# Patient Record
Sex: FEMALE | Race: WHITE | NOT HISPANIC OR LATINO | Employment: FULL TIME | URBAN - NONMETROPOLITAN AREA
[De-identification: names, ages, dates, MRNs, and addresses within clinical notes are randomized per-mention and may not be internally consistent; named-entity substitution may affect disease eponyms.]

---

## 2017-01-15 ENCOUNTER — APPOINTMENT (OUTPATIENT)
Dept: CT IMAGING | Facility: HOSPITAL | Age: 27
End: 2017-01-15

## 2017-01-15 ENCOUNTER — HOSPITAL ENCOUNTER (EMERGENCY)
Facility: HOSPITAL | Age: 27
Discharge: HOME | End: 2017-01-15
Attending: EMERGENCY MEDICINE | Admitting: EMERGENCY MEDICINE

## 2017-01-15 VITALS
HEIGHT: 65 IN | BODY MASS INDEX: 46.65 KG/M2 | OXYGEN SATURATION: 99 % | TEMPERATURE: 98.6 F | WEIGHT: 280 LBS | DIASTOLIC BLOOD PRESSURE: 65 MMHG | SYSTOLIC BLOOD PRESSURE: 127 MMHG | RESPIRATION RATE: 14 BRPM | HEART RATE: 86 BPM

## 2017-01-15 DIAGNOSIS — N20.1 LEFT URETERAL STONE: Primary | ICD-10-CM

## 2017-01-15 LAB
ALBUMIN SERPL-MCNC: 4.4 G/DL (ref 3.5–5)
ALBUMIN/GLOB SERPL: 1.3 G/DL (ref 1.1–2.5)
ALP SERPL-CCNC: 60 U/L (ref 24–120)
ALT SERPL W P-5'-P-CCNC: 116 U/L (ref 0–54)
AMYLASE SERPL-CCNC: 66 U/L (ref 30–110)
ANION GAP SERPL CALCULATED.3IONS-SCNC: 16 MMOL/L (ref 4–13)
AST SERPL-CCNC: 56 U/L (ref 7–45)
B-HCG SERPL-ACNC: 0 MIU/ML (ref 0–5)
BACTERIA UR QL AUTO: ABNORMAL /HPF
BASOPHILS # BLD AUTO: 0.05 10*3/MM3 (ref 0–0.2)
BASOPHILS NFR BLD AUTO: 0.5 % (ref 0–2)
BILIRUB SERPL-MCNC: 0.7 MG/DL (ref 0.1–1)
BILIRUB UR QL STRIP: NEGATIVE
BUN BLD-MCNC: 15 MG/DL (ref 5–21)
BUN/CREAT SERPL: 18.8 (ref 7–25)
CALCIUM SPEC-SCNC: 9.5 MG/DL (ref 8.4–10.4)
CHLORIDE SERPL-SCNC: 105 MMOL/L (ref 98–110)
CLARITY UR: ABNORMAL
CO2 SERPL-SCNC: 20 MMOL/L (ref 24–31)
COLOR UR: ABNORMAL
CREAT BLD-MCNC: 0.8 MG/DL (ref 0.5–1.4)
D-LACTATE SERPL-SCNC: 1.8 MMOL/L (ref 0.5–2)
DEPRECATED RDW RBC AUTO: 39 FL (ref 40–54)
EOSINOPHIL # BLD AUTO: 0.05 10*3/MM3 (ref 0–0.7)
EOSINOPHIL NFR BLD AUTO: 0.5 % (ref 0–4)
ERYTHROCYTE [DISTWIDTH] IN BLOOD BY AUTOMATED COUNT: 13 % (ref 12–15)
GFR SERPL CREATININE-BSD FRML MDRD: 88 ML/MIN/1.73
GLOBULIN UR ELPH-MCNC: 3.5 GM/DL
GLUCOSE BLD-MCNC: 136 MG/DL (ref 70–100)
GLUCOSE UR STRIP-MCNC: NEGATIVE MG/DL
HCT VFR BLD AUTO: 41.6 % (ref 37–47)
HGB BLD-MCNC: 14.6 G/DL (ref 12–16)
HGB UR QL STRIP.AUTO: ABNORMAL
HOLD SPECIMEN: NORMAL
HOLD SPECIMEN: NORMAL
HYALINE CASTS UR QL AUTO: ABNORMAL /LPF
IMM GRANULOCYTES # BLD: 0.02 10*3/MM3 (ref 0–0.03)
IMM GRANULOCYTES NFR BLD: 0.2 % (ref 0–5)
KETONES UR QL STRIP: NEGATIVE
LEUKOCYTE ESTERASE UR QL STRIP.AUTO: ABNORMAL
LIPASE SERPL-CCNC: 129 U/L (ref 23–203)
LYMPHOCYTES # BLD AUTO: 3.55 10*3/MM3 (ref 0.72–4.86)
LYMPHOCYTES NFR BLD AUTO: 36 % (ref 15–45)
MCH RBC QN AUTO: 29.4 PG (ref 28–32)
MCHC RBC AUTO-ENTMCNC: 35.1 G/DL (ref 33–36)
MCV RBC AUTO: 83.7 FL (ref 82–98)
MONOCYTES # BLD AUTO: 1.13 10*3/MM3 (ref 0.19–1.3)
MONOCYTES NFR BLD AUTO: 11.5 % (ref 4–12)
NEUTROPHILS # BLD AUTO: 5.06 10*3/MM3 (ref 1.87–8.4)
NEUTROPHILS NFR BLD AUTO: 51.3 % (ref 39–78)
NITRITE UR QL STRIP: NEGATIVE
PH UR STRIP.AUTO: 5.5 [PH] (ref 5–8)
PLATELET # BLD AUTO: 309 10*3/MM3 (ref 130–400)
PMV BLD AUTO: 11.1 FL (ref 6–12)
POTASSIUM BLD-SCNC: 3.5 MMOL/L (ref 3.5–5.3)
PROT SERPL-MCNC: 7.9 G/DL (ref 6.3–8.7)
PROT UR QL STRIP: ABNORMAL
RBC # BLD AUTO: 4.97 10*6/MM3 (ref 4.2–5.4)
RBC # UR: ABNORMAL /HPF
REF LAB TEST METHOD: ABNORMAL
SODIUM BLD-SCNC: 141 MMOL/L (ref 135–145)
SP GR UR STRIP: >1.03 (ref 1–1.03)
SQUAMOUS #/AREA URNS HPF: ABNORMAL /HPF
UROBILINOGEN UR QL STRIP: ABNORMAL
WBC NRBC COR # BLD: 9.86 10*3/MM3 (ref 4.8–10.8)
WBC UR QL AUTO: ABNORMAL /HPF
WHOLE BLOOD HOLD SPECIMEN: NORMAL
WHOLE BLOOD HOLD SPECIMEN: NORMAL

## 2017-01-15 PROCEDURE — 84703 CHORIONIC GONADOTROPIN ASSAY: CPT

## 2017-01-15 PROCEDURE — 96375 TX/PRO/DX INJ NEW DRUG ADDON: CPT

## 2017-01-15 PROCEDURE — 80053 COMPREHEN METABOLIC PANEL: CPT | Performed by: EMERGENCY MEDICINE

## 2017-01-15 PROCEDURE — 81001 URINALYSIS AUTO W/SCOPE: CPT | Performed by: EMERGENCY MEDICINE

## 2017-01-15 PROCEDURE — 96361 HYDRATE IV INFUSION ADD-ON: CPT

## 2017-01-15 PROCEDURE — 82150 ASSAY OF AMYLASE: CPT | Performed by: EMERGENCY MEDICINE

## 2017-01-15 PROCEDURE — 25010000002 ONDANSETRON PER 1 MG: Performed by: EMERGENCY MEDICINE

## 2017-01-15 PROCEDURE — 74177 CT ABD & PELVIS W/CONTRAST: CPT

## 2017-01-15 PROCEDURE — 0 IOPAMIDOL 61 % SOLUTION: Performed by: EMERGENCY MEDICINE

## 2017-01-15 PROCEDURE — 85025 COMPLETE CBC W/AUTO DIFF WBC: CPT | Performed by: EMERGENCY MEDICINE

## 2017-01-15 PROCEDURE — 83690 ASSAY OF LIPASE: CPT | Performed by: EMERGENCY MEDICINE

## 2017-01-15 PROCEDURE — 99284 EMERGENCY DEPT VISIT MOD MDM: CPT

## 2017-01-15 PROCEDURE — 25010000002 HYDROMORPHONE PER 4 MG: Performed by: EMERGENCY MEDICINE

## 2017-01-15 PROCEDURE — 87086 URINE CULTURE/COLONY COUNT: CPT | Performed by: EMERGENCY MEDICINE

## 2017-01-15 PROCEDURE — 96374 THER/PROPH/DIAG INJ IV PUSH: CPT

## 2017-01-15 PROCEDURE — 83605 ASSAY OF LACTIC ACID: CPT

## 2017-01-15 RX ORDER — CLONIDINE HYDROCHLORIDE 0.1 MG/1
0.1 TABLET ORAL AS NEEDED
COMMUNITY
End: 2020-03-23

## 2017-01-15 RX ORDER — OXYCODONE AND ACETAMINOPHEN 7.5; 325 MG/1; MG/1
1 TABLET ORAL EVERY 4 HOURS PRN
Qty: 20 TABLET | Refills: 0 | Status: SHIPPED | OUTPATIENT
Start: 2017-01-15 | End: 2020-03-23

## 2017-01-15 RX ORDER — INDOMETHACIN 50 MG/1
50 CAPSULE ORAL
Qty: 21 CAPSULE | Refills: 0 | Status: SHIPPED | OUTPATIENT
Start: 2017-01-15 | End: 2020-03-23

## 2017-01-15 RX ORDER — ONDANSETRON 4 MG/1
4 TABLET, ORALLY DISINTEGRATING ORAL EVERY 4 HOURS PRN
Qty: 10 TABLET | Refills: 0 | Status: SHIPPED | OUTPATIENT
Start: 2017-01-15 | End: 2020-03-23

## 2017-01-15 RX ORDER — LISINOPRIL 5 MG/1
5 TABLET ORAL DAILY
COMMUNITY
End: 2020-03-23

## 2017-01-15 RX ORDER — ONDANSETRON 2 MG/ML
4 INJECTION INTRAMUSCULAR; INTRAVENOUS ONCE
Status: COMPLETED | OUTPATIENT
Start: 2017-01-15 | End: 2017-01-15

## 2017-01-15 RX ORDER — SODIUM CHLORIDE 9 MG/ML
125 INJECTION, SOLUTION INTRAVENOUS CONTINUOUS
Status: DISCONTINUED | OUTPATIENT
Start: 2017-01-15 | End: 2017-01-15 | Stop reason: HOSPADM

## 2017-01-15 RX ADMIN — IOPAMIDOL 100 ML: 612 INJECTION, SOLUTION INTRAVENOUS at 06:19

## 2017-01-15 RX ADMIN — ONDANSETRON 4 MG: 2 INJECTION INTRAMUSCULAR; INTRAVENOUS at 04:57

## 2017-01-15 RX ADMIN — SODIUM CHLORIDE 125 ML/HR: 9 INJECTION, SOLUTION INTRAVENOUS at 04:25

## 2017-01-15 RX ADMIN — HYDROMORPHONE HYDROCHLORIDE 1 MG: 1 INJECTION, SOLUTION INTRAMUSCULAR; INTRAVENOUS; SUBCUTANEOUS at 05:17

## 2017-01-15 NOTE — ED PROVIDER NOTES
Subjective  nausea vomiting abdominal pain  History of Present Illness Evin is a 26-year-old white female who presents today with chief complaint of abdominal pain and back pain.  The patient tells me that while she can tolerate the abdominal pain the back pain was so severe that she was unable to move.  She describes pain in the lower back that seems to radiate around into her lower abdominal area.  She does not have any history of kidney stones.  She denies pregnancy at this time she additionally states she has had no frequency urgency or dysuria.  There has been no lightheadedness.  She has never had pain like this before.  She did not take anything at home to help alleviate her discomfort.    Review of Systems   Constitutional: Negative.    HENT: Negative.    Eyes: Negative.    Respiratory: Negative.    Cardiovascular: Negative.    Gastrointestinal: Positive for abdominal pain, nausea and vomiting.   Endocrine: Negative.    Genitourinary: Negative.    Musculoskeletal: Positive for back pain.   Skin: Negative.    Allergic/Immunologic: Negative.    Neurological: Negative.    Hematological: Negative.    Psychiatric/Behavioral: Negative.    All other systems reviewed and are negative.      Past Medical History   Diagnosis Date   • Hypertension        Allergies   Allergen Reactions   • Penicillins        Past Surgical History   Procedure Laterality Date   • Tonsillectomy         History reviewed. No pertinent family history.    Social History     Social History   • Marital status: Single     Spouse name: N/A   • Number of children: N/A   • Years of education: N/A     Social History Main Topics   • Smoking status: Never Smoker   • Smokeless tobacco: None   • Alcohol use No   • Drug use: No   • Sexual activity: Defer     Other Topics Concern   • None     Social History Narrative   • None       Prior to Admission medications    Medication Sig Start Date End Date Taking? Authorizing Provider   CloNIDine (CATAPRES)  0.1 MG tablet Take 0.1 mg by mouth As Needed for high blood pressure.   Yes Historical Provider, MD   estradiol cypionate (DEPO-ESTRADIOL) 5 MG/ML injection Inject  into the shoulder, thigh, or buttocks Every 28 (Twenty-Eight) Days.   Yes Historical Provider, MD   lisinopril (PRINIVIL,ZESTRIL) 5 MG tablet Take 5 mg by mouth Daily.   Yes Historical Provider, MD       Medications   ondansetron (ZOFRAN) injection 4 mg (4 mg Intravenous Given 1/15/17 3543)   HYDROmorphone (DILAUDID) injection 1 mg (1 mg Intravenous Given 1/15/17 8694)   iopamidol (ISOVUE-300) 61 % injection 100 mL (100 mL Intravenous Given 1/15/17 1325)       Objective   Physical Exam   Constitutional: She is oriented to person, place, and time. She appears well-developed and well-nourished.   HENT:   Head: Normocephalic and atraumatic.   Right Ear: External ear normal.   Left Ear: External ear normal.   Nose: Nose normal.   Mouth/Throat: Oropharynx is clear and moist.   Eyes: Conjunctivae and EOM are normal. Pupils are equal, round, and reactive to light. Right eye exhibits no discharge. Left eye exhibits no discharge.   Neck: Normal range of motion. Neck supple. No thyromegaly present.   Cardiovascular: Normal rate, regular rhythm, normal heart sounds and intact distal pulses.  Exam reveals no friction rub.    No murmur heard.  Pulmonary/Chest: Effort normal and breath sounds normal. No respiratory distress.   Abdominal: Soft. Bowel sounds are normal. She exhibits no distension and no mass. There is tenderness. There is no rebound and no guarding. No hernia.   Musculoskeletal: Normal range of motion. She exhibits no edema or deformity.   Neurological: She is alert and oriented to person, place, and time. She has normal reflexes. No cranial nerve deficit.   Skin: Skin is warm and dry. No rash noted.   Psychiatric: Judgment normal.   Nursing note and vitals reviewed.      Procedures         Visit Vitals   • /65 (BP Location: Right arm, Patient  "Position: Lying)   • Pulse 86   • Temp 98.6 °F (37 °C) (Oral)   • Resp 14   • Ht 65\" (165.1 cm)   • Wt 280 lb (127 kg)   • SpO2 99%   • BMI 46.59 kg/m2       Lab Results (last 24 hours)     ** No results found for the last 24 hours. **          CT Abdomen Pelvis With Contrast   Final Result   1. Mild left-sided obstructive uropathy secondary to a distal left   ureteral stone measuring 4 mm in size. This is just above the left UVJ.   A 5 mm nonobstructing calculus is noted involving the lower pole of the   left kidney   2. Diffuse steatosis of the liver.   3. Otherwise unremarkable exam.           This report was finalized on 01/15/2017 08:27 by Dr. Edil Kate MD.              ED Course  ED Course          MDM  Number of Diagnoses or Management Options  Left ureteral stone: new and requires workup     Amount and/or Complexity of Data Reviewed  Clinical lab tests: ordered and reviewed  Tests in the radiology section of CPT®: ordered and reviewed  Discuss the patient with other providers: yes    Risk of Complications, Morbidity, and/or Mortality  Presenting problems: high  Diagnostic procedures: high  Management options: high    Patient Progress  Patient progress: improved      Differential diagnosis included but was not limited to Lower abdominal pain:  Diverticulitis, gastroenteritis, Irritable Bowel Syndrom, Hernia, intestinal obstruction, constipation, urinary calculi and cystitis>>. All labs and imaging results were reviewed by Kimberlyn Bush MD    Final diagnoses:   Left ureteral stone        Kimberlyn Bush MD  01/25/17 0739    "

## 2017-01-17 LAB — BACTERIA SPEC AEROBE CULT: NORMAL

## 2020-03-23 ENCOUNTER — OFFICE VISIT (OUTPATIENT)
Dept: FAMILY MEDICINE CLINIC | Facility: CLINIC | Age: 30
End: 2020-03-23

## 2020-03-23 VITALS
OXYGEN SATURATION: 97 % | HEART RATE: 106 BPM | HEIGHT: 62 IN | BODY MASS INDEX: 53.92 KG/M2 | WEIGHT: 293 LBS | SYSTOLIC BLOOD PRESSURE: 125 MMHG | DIASTOLIC BLOOD PRESSURE: 67 MMHG

## 2020-03-23 DIAGNOSIS — G44.229 CHRONIC TENSION-TYPE HEADACHE, NOT INTRACTABLE: Primary | ICD-10-CM

## 2020-03-23 DIAGNOSIS — F41.9 ANXIETY: ICD-10-CM

## 2020-03-23 DIAGNOSIS — I10 ESSENTIAL HYPERTENSION: ICD-10-CM

## 2020-03-23 DIAGNOSIS — F32.9 REACTIVE DEPRESSION: ICD-10-CM

## 2020-03-23 DIAGNOSIS — B30.9 VIRAL CONJUNCTIVITIS OF RIGHT EYE: ICD-10-CM

## 2020-03-23 PROCEDURE — 99203 OFFICE O/P NEW LOW 30 MIN: CPT | Performed by: FAMILY MEDICINE

## 2020-03-23 RX ORDER — BUTALBITAL, ACETAMINOPHEN AND CAFFEINE 300; 40; 50 MG/1; MG/1; MG/1
1 CAPSULE ORAL EVERY 4 HOURS PRN
Qty: 30 CAPSULE | Refills: 0 | Status: SHIPPED | OUTPATIENT
Start: 2020-03-23 | End: 2020-06-09

## 2020-03-23 RX ORDER — CITALOPRAM 20 MG/1
20 TABLET ORAL DAILY
Qty: 30 TABLET | Refills: 5 | Status: SHIPPED | OUTPATIENT
Start: 2020-03-23 | End: 2021-06-08

## 2020-03-23 NOTE — PROGRESS NOTES
OFFICE VISIT NOTE:    Hui Jimenez is a 30 y.o. female who presents today for Establish Care; Blurred Vision; Headache; and Flushing.     BP reportedly 140/110 this am at home. 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 Not helping the headaches at all. No day off since the end of December. Tried many OTC meds without help. Body is drained, but cannot sleep although tired.   Eyes are hurting and red, and using Visine but not helping either. Light-headed but no syncope. Does have some shakes on occasion. Some upset stomach but no emesis. No auras nor scotomata. Occasional blurry vision.   Also, has had right sided eye drainage and redness for about 2-3 days, without a sensation of foreign body.     Headache    This is a chronic problem. The current episode started more than 1 year ago. The problem occurs daily. The problem has been gradually worsening. The pain is located in the bilateral, frontal and temporal region. The pain does not radiate. The pain quality is similar to prior headaches. The quality of the pain is described as stabbing and shooting. The pain is moderate. Associated symptoms include blurred vision, phonophobia and photophobia. Pertinent negatives include no abdominal pain, fever, scalp tenderness, tinnitus, vomiting or weight loss. The symptoms are aggravated by bright light, activity, emotional stress, work, noise and weather changes. She has tried acetaminophen, NSAIDs and Excedrin for the symptoms. The treatment provided mild relief. Her past medical history is significant for hypertension and migraines in the family.        Past medical/surgical history, Family history, Social history, Allergies and Medications have been reviewed with the patient today and are updated in [facility] EMR. See below.    Past Medical History:   Diagnosis Date   • Hypertension      Past Surgical History:   Procedure Laterality Date   • TONSILLECTOMY    "    Family History   Problem Relation Age of Onset   • Diabetes Mother    • Hypertension Mother      Social History     Tobacco Use   • Smoking status: Never Smoker   • Smokeless tobacco: Never Used   Substance Use Topics   • Alcohol use: No   • Drug use: No       ALLERGIES:  Penicillins    CURRENT MEDS:    Current Outpatient Medications:   •  butalbital-acetaminophen-caffeine (ORBIVAN) -40 MG capsule capsule, Take 1 capsule by mouth Every 4 (Four) Hours As Needed (headache)., Disp: 30 capsule, Rfl: 0  •  citalopram (CeleXA) 20 MG tablet, Take 1 tablet by mouth Daily., Disp: 30 tablet, Rfl: 5    REVIEW OF SYSTEMS:  Review of Systems   Constitutional: Negative for activity change, fatigue, fever, unexpected weight gain and unexpected weight loss.   HENT: Negative for tinnitus.    Eyes: Positive for blurred vision and photophobia.   Respiratory: Negative for shortness of breath.    Cardiovascular: Negative for chest pain.   Gastrointestinal: Negative for abdominal pain and vomiting.   Genitourinary: Negative for difficulty urinating.   Skin: Negative for rash.   Neurological: Negative for syncope and headache.       PHYSICAL EXAMINATION:  Vital Signs:  /67 (BP Location: Left arm, Patient Position: Sitting, Cuff Size: Adult)   Pulse 106   Ht 157.5 cm (62\")   Wt 134 kg (296 lb)   SpO2 97%   BMI 54.14 kg/m²   Vitals:    03/23/20 1615   Patient Position: Sitting       Physical Exam   Constitutional: She is oriented to person, place, and time. She appears well-developed and well-nourished. No distress.   HENT:   Head: Normocephalic and atraumatic.   Mouth/Throat: Oropharynx is clear and moist.   Neck: Normal range of motion. Neck supple. No JVD present.   Cardiovascular: Normal rate, regular rhythm, normal heart sounds and intact distal pulses.   Pulmonary/Chest: Effort normal and breath sounds normal. No respiratory distress.   Musculoskeletal: Normal range of motion. She exhibits no edema. "   Neurological: She is alert and oriented to person, place, and time. No cranial nerve deficit or sensory deficit. She exhibits normal muscle tone. Coordination normal.   Skin: Skin is warm and dry. Capillary refill takes less than 2 seconds. No rash noted.   Psychiatric: She has a normal mood and affect. Her behavior is normal.   Nursing note and vitals reviewed.      Procedures    ASSESSMENT/ PLAN:  Problem List Items Addressed This Visit        Cardiovascular and Mediastinum    Essential hypertension       Nervous and Auditory    Chronic tension-type headache, not intractable - Primary    Relevant Medications    butalbital-acetaminophen-caffeine (ORBIVAN) -40 MG capsule capsule    citalopram (CeleXA) 20 MG tablet       Other    Reactive depression    Relevant Medications    citalopram (CeleXA) 20 MG tablet    Anxiety    Relevant Medications    citalopram (CeleXA) 20 MG tablet      Other Visit Diagnoses     Viral conjunctivitis of right eye                Specific Patient Instructions:  MEDICATION Instructions: Encouraged patient to continue routine medicines as prescribed and maintain compliance. Patient instructed to report any adverse side effects or reactions to medicines promptly to the office. Patient instructed to make us aware of any OTC or herbal meds or supplement use.    DIET Recommendations: Patient instructed and provided information on the following nutrition and diet recommendations: Calorie restriction for weight reduction and maintenance. Necessity for adequate daily intake of fluids/water. Also, diet information provided in AVS for specifics.    EXERCISE Instructions: Discussed with patient the need for routine aerobic activity for cardiovascular fitness, 3 times a week for about 30 minutes. Daily exercise for increased fitness and weight reduction goals.    SMOKING Recommendations: Counseled patient and encouraged them on smoking and tobacco cessation if applicable. Discussed the benefits  to all body systems with smoking/tobacco cessation, including decreased cardiac/lung/stroke/cancer risk. Encouraged no vaping use.    HEALTH MAINTENANCE:  Counseling provided to patient/family about routine health maintenance and ANNUAL physicals/labs. Counseling on recommended Vaccinations appropriate for age needed.        Patient's Body mass index is 54.14 kg/m². BMI is above normal parameters. Recommendations include: exercise counseling and nutrition counseling.      Medications or Orders placed this visit:  No orders of the defined types were placed in this encounter.      Medications DISCONTINUED this visit:  Medications Discontinued During This Encounter   Medication Reason   • CloNIDine (CATAPRES) 0.1 MG tablet Historical Med - Therapy completed   • estradiol cypionate (DEPO-ESTRADIOL) 5 MG/ML injection Historical Med - Therapy completed   • indomethacin (INDOCIN) 50 MG capsule Historical Med - Therapy completed   • lisinopril (PRINIVIL,ZESTRIL) 5 MG tablet Historical Med - Therapy completed   • ondansetron ODT (ZOFRAN-ODT) 4 MG disintegrating tablet Historical Med - Therapy completed   • oxyCODONE-acetaminophen (PERCOCET) 7.5-325 MG per tablet Historical Med - Therapy completed       FOLLOW-UP:  Return in about 3 weeks (around 4/13/2020) for Recheck.    I discussed the patients findings and my recommendations with patient.  An After Visit Summary (AVS) was printed and given to the patient at discharge.      Gregg Vincent MD, FAAFP  3/23/2020

## 2020-03-23 NOTE — PATIENT INSTRUCTIONS
Suspect Essential HTN.Good BP control is encouraged with Goal BP based on JNC 8 guidelines 2014 <140/90 for patients with known cardiac disease and diabetes. (MINH. 2014:322 (5):507-520. doi:10.1001/minh.2013.03417): general population <60 yr old goal BP <140/90 and for those >60 <150/90.  For patients of all ages with Diabetes, CKD, Known CAD <140/90. Recommended to the patient to obtain electronic home BP machine with upper arm blood pressure cuff and to check regularly as instructed.  Keep BP log and bring to subsequent visits. Stable, at goal.  a. LABS: routine for hypertension recommended and ordered if necessary.  b. Recommend if you do not have a home BP machine to obtain an electronic machine with arm blood pressure cuff.      c. Monitor BP over the next week and keep log to bring back to office. Discussed medication therapy however pt wants to try to control with diet exercise. .  Your provider  has recommended self-monitoring of your blood pressure.  If you do not have a blood pressure cuff you may purchase one from the local pharmacy.  You may ask the pharmacist which brand and model they recommend.  Obtain your blood pressure measurement at least 2x per week.  You should also check your blood pressure if you experience any symptoms of blurred visit, dizziness or headache.  Please record all blood pressure measurements and bring them to next office visit.  If you have any questions about the accuracy of your blood pressure machine please bring it in to the office and our staff will be happy to check accuracy.   d. Encouraged to eat a low sodium heart healthy diet  e. Offered handout on HTN educational topics.  These were provided if patient requested these today.  f. MEDS: as listed in today's visit.  g. Risks/benefits of current and new medications discussed with the patient and or family today.  The patient/family are aware and accept that if there any side effects they should call or return to clinic  as soon as possible.  Appropriate F/U discussed for topics addressed today. All questions were answered to the  satisfactory state of patient/family.  Should symptoms fail to improve or worsen they agree to call or return to clinic or to go to the ER. Education handouts were offered on any new Rx if requested.  Discussed the importance of following up with any needed screening tests/labs/specialist appointments and any requested follow-up recommended by me today.  Importance of maintaining follow-up discussed and patient accepts that missed appointments can delay diagnosis and potentially lead to worsening of conditions.      Tension Headache, Adult  A tension headache is a feeling of pain, pressure, or aching in the head that is often felt over the front and sides of the head. The pain can be dull, or it can feel tight (constricting). There are two types of tension headache:  · Episodic tension headache. This is when the headaches happen fewer than 15 days a month.  · Chronic tension headache. This is when the headaches happen more than 15 days a month during a 3-month period.  A tension headache can last from 30 minutes to several days. It is the most common kind of headache. Tension headaches are not normally associated with nausea or vomiting, and they do not get worse with physical activity.  What are the causes?  The exact cause of this condition is not known. Tension headaches are often triggered by stress, anxiety, or depression. Other triggers include:  · Alcohol.  · Too much caffeine or caffeine withdrawal.  · Respiratory infections, such as colds, flu, or sinus infections.  · Dental problems or teeth clenching.  · Tiredness (fatigue).  · Holding your head and neck in the same position for a long period of time, such as while using a computer.  · Smoking.  · Arthritis of the neck.  What are the signs or symptoms?  Symptoms of this condition include:  · A feeling of pressure or tightness around the  head.  · Dull, aching head pain.  · Pain over the front and sides of the head.  · Tenderness in the muscles of the head, neck, and shoulders.  How is this diagnosed?  This condition may be diagnosed based on your symptoms, your medical history, and a physical exam. If your symptoms are severe or unusual, you may have imaging tests, such as a CT scan or an MRI of your head. Your vision may also be checked.  How is this treated?  This condition may be treated with lifestyle changes and with medicines that help relieve symptoms.  Follow these instructions at home:  Managing pain  · Take over-the-counter and prescription medicines only as told by your health care provider.  · When you have a headache, lie down in a dark, quiet room.  · If directed, apply ice to the head and neck:  ? Put ice in a plastic bag.  ? Place a towel between your skin and the bag.  ? Leave the ice on for 20 minutes, 2-3 times a day.  · If directed, apply heat to the back of your neck as often as told by your health care provider. Use the heat source that your health care provider recommends, such as a moist heat pack or a heating pad.  ? Place a towel between your skin and the heat source.  ? Leave the heat on for 20-30 minutes.  ? Remove the heat if your skin turns bright red. This is especially important if you are unable to feel pain, heat, or cold. You may have a greater risk of getting burned.  Eating and drinking  · Eat meals on a regular schedule.  · Limit alcohol intake to no more than 1 drink a day for nonpregnant women and 2 drinks a day for men. One drink equals 12 oz of beer, 5 oz of wine, or 1½ oz of hard liquor.  · Drink enough fluid to keep your urine pale yellow.  · Decrease your caffeine intake, or stop using caffeine.  Lifestyle  · Get 7-9 hours of sleep each night, or get the amount of sleep recommended by your health care provider.  · At bedtime, remove all electronic devices from your room. Electronic devices include  computers, phones, and tablets.  · Find ways to manage your stress. Some things that can help relieve stress include:  ? Exercise.  ? Deep breathing exercises.  ? Yoga.  ? Listening to music.  ? Positive mental imagery.  · Try to sit up straight and avoid tensing your muscles.  · Do not use any products that contain nicotine or tobacco, such as cigarettes and e-cigarettes. If you need help quitting, ask your health care provider.  General instructions    · Keep all follow-up visits as told by your health care provider. This is important.  · Avoid any headache triggers. Keep a headache journal to help find out what may trigger your headaches. For example, write down:  ? What you eat and drink.  ? How much sleep you get.  ? Any change to your diet or medicines.  Contact a health care provider if:  · Your headache does not get better.  · Your headache comes back.  · You are sensitive to sounds, light, or smells because of a headache.  · You have nausea or you vomit.  · Your stomach hurts.  Get help right away if:  · You suddenly develop a very severe headache along with any of the following:  ? A stiff neck.  ? Nausea and vomiting.  ? Confusion.  ? Weakness.  ? Double vision or loss of vision.  ? Shortness of breath.  ? Rash.  ? Unusual sleepiness.  ? Fever.  ? Trouble speaking.  ? Pain in your eyes or ears.  ? Trouble walking or balancing.  ? Feeling faint or passing out.  Summary  · A tension headache is a feeling of pain, pressure, or aching in the head that is often felt over the front and sides of the head.  · A tension headache can last from 30 minutes to several days. It is the most common kind of headache.  · This condition may be diagnosed based on your symptoms, your medical history, and a physical exam.  · This condition may be treated with lifestyle changes and with medicines that help relieve symptoms.  This information is not intended to replace advice given to you by your health care provider. Make sure  you discuss any questions you have with your health care provider.  Document Released: 12/18/2006 Document Revised: 03/30/2018 Document Reviewed: 03/30/2018  Elsevier Interactive Patient Education © 2020 [employer]

## 2020-03-24 ENCOUNTER — TELEPHONE (OUTPATIENT)
Dept: FAMILY MEDICINE CLINIC | Facility: CLINIC | Age: 30
End: 2020-03-24

## 2020-03-24 NOTE — TELEPHONE ENCOUNTER
Apparently she got the letter however did not take it to employer.  I printed a copy of the letter and faxed it to number patient provided.

## 2020-03-24 NOTE — TELEPHONE ENCOUNTER
Patient called and stated that she was seen in the office yesterday and that she was placed off of work for a while. She says that her employer is requesting a note from Dr. Vincent be faxed to them directly at [phone].    She can be contacted at [phone] to clarify and confirm any info.

## 2020-04-09 ENCOUNTER — TELEPHONE (OUTPATIENT)
Dept: FAMILY MEDICINE CLINIC | Facility: CLINIC | Age: 30
End: 2020-04-09

## 2020-04-09 NOTE — TELEPHONE ENCOUNTER
Spoke with Pt to reschedule appt for 4/13/20 with Dr. Vincent. Pt stated she does not need appt, only needs paperwork filled out by Dr. Vincent stating she can return to work. Per Pt she will bring this by the office for completed. She asks that this be completed by 4/16/20.

## 2020-05-12 ENCOUNTER — OFFICE VISIT (OUTPATIENT)
Dept: FAMILY MEDICINE CLINIC | Facility: CLINIC | Age: 30
End: 2020-05-12

## 2020-05-12 VITALS
WEIGHT: 293 LBS | HEART RATE: 102 BPM | DIASTOLIC BLOOD PRESSURE: 83 MMHG | HEIGHT: 62 IN | BODY MASS INDEX: 53.92 KG/M2 | OXYGEN SATURATION: 96 % | SYSTOLIC BLOOD PRESSURE: 122 MMHG | TEMPERATURE: 97.8 F

## 2020-05-12 DIAGNOSIS — R53.83 FATIGUE, UNSPECIFIED TYPE: ICD-10-CM

## 2020-05-12 DIAGNOSIS — R63.5 WEIGHT GAIN, ABNORMAL: ICD-10-CM

## 2020-05-12 DIAGNOSIS — E66.01 MORBID OBESITY WITH BMI OF 50.0-59.9, ADULT (HCC): Primary | ICD-10-CM

## 2020-05-12 PROCEDURE — 99214 OFFICE O/P EST MOD 30 MIN: CPT | Performed by: NURSE PRACTITIONER

## 2020-05-12 NOTE — PROGRESS NOTES
Chief Complaint   Patient presents with   • Consult     Discuss weigt loss surgery        Subjective   Hui Jimenez is a 30 y.o.  female who presents today for referral request.    HPI:  OBESITY: Patient has been obese her entire life.  She is very active running 2 miles daily and working out at the gym daily as well.  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  She has tried Weight Watchers for 10 months and lost 30 lbs.  She has not been below 260.  She has eliminated concentrated carbs from her diet.  She does not drink soda or sugary drinks.    Hui has spoken to Dr. Obrien in [place] (Bariatric Surgeon).  He recommended starting here with PCP and will gladly accept her if our endeavors fail.  She is interested in the aid of medication to assist with her weight loss.    Hui Jimenez  has a past medical history of Hypertension and Obesity.    Allergies   Allergen Reactions   • Penicillins        Current Outpatient Medications:   •  butalbital-acetaminophen-caffeine (ORBIVAN) -40 MG capsule capsule, Take 1 capsule by mouth Every 4 (Four) Hours As Needed (headache)., Disp: 30 capsule, Rfl: 0  •  citalopram (CeleXA) 20 MG tablet, Take 1 tablet by mouth Daily., Disp: 30 tablet, Rfl: 5  Past Medical History:   Diagnosis Date   • Hypertension    • Obesity      Past Surgical History:   Procedure Laterality Date   • TONSILLECTOMY       Social History     Socioeconomic History   • Marital status: Single     Spouse name: Not on file   • Number of children: Not on file   • Years of education: Not on file   • Highest education level: Not on file   Tobacco Use   • Smoking status: Never Smoker   • Smokeless tobacco: Never Used   Substance and Sexual Activity   • Alcohol use: No   • Drug use: No   • Sexual activity: Not Currently     Partners: Male     Family History   Problem Relation Age of Onset   • Diabetes Mother    • Hypertension Mother   "      Family history, surgical history, past medical history, Allergies and med's reviewed with patient today and updated in [facility] EMR.     ROS:  Review of Systems   Constitutional: Negative.  Negative for fatigue, fever and unexpected weight change.   HENT: Negative.  Negative for facial swelling, sore throat and trouble swallowing.    Eyes: Negative.  Negative for photophobia, discharge and visual disturbance.   Respiratory: Negative.  Negative for cough, chest tightness and shortness of breath.    Cardiovascular: Negative.  Negative for chest pain and palpitations.   Gastrointestinal: Negative.  Negative for abdominal pain, diarrhea, nausea and vomiting.   Endocrine: Negative.  Negative for polydipsia, polyphagia and polyuria.   Genitourinary: Negative.  Negative for dysuria, flank pain and frequency.   Musculoskeletal: Negative.  Negative for back pain, gait problem and neck pain.   Skin: Negative.  Negative for rash.   Allergic/Immunologic: Negative.    Neurological: Negative.  Negative for dizziness, light-headedness and headaches.   Hematological: Negative.    Psychiatric/Behavioral: Negative.  Negative for self-injury and suicidal ideas.       OBJECTIVE:  Vitals:    05/12/20 0735   BP: 122/83   Pulse: 102   Temp: 97.8 °F (36.6 °C)   TempSrc: Temporal   SpO2: 96%   Weight: 133 kg (293 lb 9.6 oz)   Height: 157.5 cm (62.01\")     Physical Exam   Constitutional: She is oriented to person, place, and time. She appears well-developed and well-nourished. No distress.   HENT:   Head: Normocephalic and atraumatic.   Eyes: Pupils are equal, round, and reactive to light. Conjunctivae and EOM are normal.   Neck: Normal range of motion. Neck supple.   Cardiovascular: Normal rate, regular rhythm, normal heart sounds and intact distal pulses.   No murmur heard.  Pulmonary/Chest: Effort normal and breath sounds normal. No respiratory distress.   Abdominal: Soft. Bowel sounds are normal. She exhibits no distension. There is no " tenderness.   Musculoskeletal: Normal range of motion. She exhibits no edema.   Neurological: She is alert and oriented to person, place, and time.   Skin: Skin is warm and dry. Capillary refill takes less than 2 seconds. She is not diaphoretic. No erythema.   Psychiatric: She has a normal mood and affect. Her behavior is normal. Judgment and thought content normal.   Nursing note and vitals reviewed.      ASSESSMENT/ PLAN:    Hui was seen today for consult.    Diagnoses and all orders for this visit:    Morbid obesity with BMI of 50.0-59.9, adult (CMS/[facility])  -     Comprehensive metabolic panel  -     Hemoglobin A1c  -     Lipid Panel With LDL/HDL Ratio  -     T4  -     TSH  -     CBC w AUTO Differential    Fatigue, unspecified type  -     Comprehensive metabolic panel  -     CBC w AUTO Differential    Weight gain, abnormal  -     Hemoglobin A1c  -     T4  -     TSH    Once lab results are reviewed:  If normal we will proceed with one month of Phentermine and then her annual physical.  Our plan is then to switch to Saxenda.  Patient is agreeable.    Orders Placed Today:     No orders of the defined types were placed in this encounter.       Management Plan:     An After Visit Summary was printed and given to the patient at discharge.    Follow-up: Return in about 4 weeks (around 6/9/2020) for Annual physical with labs today.    Ayah Prasad, APRN 5/12/2020 07:58  This note was electronically signed.

## 2020-05-13 LAB
ALBUMIN SERPL-MCNC: 3.9 G/DL (ref 3.9–5)
ALBUMIN/GLOB SERPL: 1.5 {RATIO} (ref 1.2–2.2)
ALP SERPL-CCNC: 83 IU/L (ref 39–117)
ALT SERPL-CCNC: 36 IU/L (ref 0–32)
AST SERPL-CCNC: 27 IU/L (ref 0–40)
BASOPHILS # BLD AUTO: 0.1 X10E3/UL (ref 0–0.2)
BASOPHILS NFR BLD AUTO: 1 %
BILIRUB SERPL-MCNC: 0.3 MG/DL (ref 0–1.2)
BUN SERPL-MCNC: 16 MG/DL (ref 6–20)
BUN/CREAT SERPL: 20 (ref 9–23)
CALCIUM SERPL-MCNC: 9.5 MG/DL (ref 8.7–10.2)
CHLORIDE SERPL-SCNC: 104 MMOL/L (ref 96–106)
CHOLEST SERPL-MCNC: 182 MG/DL (ref 100–199)
CO2 SERPL-SCNC: 24 MMOL/L (ref 20–29)
CREAT SERPL-MCNC: 0.79 MG/DL (ref 0.57–1)
EOSINOPHIL # BLD AUTO: 0.1 X10E3/UL (ref 0–0.4)
EOSINOPHIL NFR BLD AUTO: 1 %
ERYTHROCYTE [DISTWIDTH] IN BLOOD BY AUTOMATED COUNT: 13.1 % (ref 11.7–15.4)
GLOBULIN SER CALC-MCNC: 2.6 G/DL (ref 1.5–4.5)
GLUCOSE SERPL-MCNC: 88 MG/DL (ref 65–99)
HBA1C MFR BLD: 5.2 % (ref 4.8–5.6)
HCT VFR BLD AUTO: 41.9 % (ref 34–46.6)
HDLC SERPL-MCNC: 56 MG/DL
HGB BLD-MCNC: 13.5 G/DL (ref 11.1–15.9)
IMM GRANULOCYTES # BLD AUTO: 0 X10E3/UL (ref 0–0.1)
IMM GRANULOCYTES NFR BLD AUTO: 0 %
LDLC SERPL CALC-MCNC: 113 MG/DL (ref 0–99)
LDLC/HDLC SERPL: 2 RATIO (ref 0–3.2)
LYMPHOCYTES # BLD AUTO: 2.1 X10E3/UL (ref 0.7–3.1)
LYMPHOCYTES NFR BLD AUTO: 23 %
MCH RBC QN AUTO: 28.3 PG (ref 26.6–33)
MCHC RBC AUTO-ENTMCNC: 32.2 G/DL (ref 31.5–35.7)
MCV RBC AUTO: 88 FL (ref 79–97)
MONOCYTES # BLD AUTO: 1.2 X10E3/UL (ref 0.1–0.9)
MONOCYTES NFR BLD AUTO: 13 %
NEUTROPHILS # BLD AUTO: 5.8 X10E3/UL (ref 1.4–7)
NEUTROPHILS NFR BLD AUTO: 62 %
PLATELET # BLD AUTO: 356 X10E3/UL (ref 150–450)
POTASSIUM SERPL-SCNC: 4.4 MMOL/L (ref 3.5–5.2)
PROT SERPL-MCNC: 6.5 G/DL (ref 6–8.5)
RBC # BLD AUTO: 4.77 X10E6/UL (ref 3.77–5.28)
SODIUM SERPL-SCNC: 143 MMOL/L (ref 134–144)
T4 SERPL-MCNC: 9.6 UG/DL (ref 4.5–12)
TRIGL SERPL-MCNC: 63 MG/DL (ref 0–149)
TSH SERPL DL<=0.005 MIU/L-ACNC: 1.82 UIU/ML (ref 0.45–4.5)
VLDLC SERPL CALC-MCNC: 13 MG/DL (ref 5–40)
WBC # BLD AUTO: 9.3 X10E3/UL (ref 3.4–10.8)

## 2020-05-14 DIAGNOSIS — E66.01 MORBID OBESITY WITH BMI OF 50.0-59.9, ADULT (HCC): Primary | ICD-10-CM

## 2020-05-14 RX ORDER — PHENTERMINE HYDROCHLORIDE 37.5 MG/1
37.5 TABLET ORAL
Qty: 30 TABLET | Refills: 0 | Status: SHIPPED | OUTPATIENT
Start: 2020-05-14 | End: 2020-09-17

## 2020-06-09 ENCOUNTER — OFFICE VISIT (OUTPATIENT)
Dept: FAMILY MEDICINE CLINIC | Facility: CLINIC | Age: 30
End: 2020-06-09

## 2020-06-09 VITALS
WEIGHT: 281.6 LBS | DIASTOLIC BLOOD PRESSURE: 84 MMHG | HEART RATE: 93 BPM | OXYGEN SATURATION: 96 % | TEMPERATURE: 97.7 F | HEIGHT: 62 IN | BODY MASS INDEX: 51.82 KG/M2 | SYSTOLIC BLOOD PRESSURE: 117 MMHG

## 2020-06-09 DIAGNOSIS — E66.01 MORBID OBESITY WITH BMI OF 50.0-59.9, ADULT (HCC): ICD-10-CM

## 2020-06-09 DIAGNOSIS — Z00.00 WELLNESS EXAMINATION: Primary | ICD-10-CM

## 2020-06-09 DIAGNOSIS — Z01.419 ENCOUNTER FOR WELL WOMAN EXAM WITH ROUTINE GYNECOLOGICAL EXAM: ICD-10-CM

## 2020-06-09 PROCEDURE — 99395 PREV VISIT EST AGE 18-39: CPT | Performed by: NURSE PRACTITIONER

## 2020-06-09 RX ORDER — LISINOPRIL 10 MG/1
10 TABLET ORAL DAILY
COMMUNITY
End: 2022-10-13

## 2020-06-09 NOTE — PROGRESS NOTES
Chief Complaint   Patient presents with   • Annual Exam     Pap?        Subjective   Hui Jimenez is a 30 y.o.  female who presents today for annual wellness.    PATIENT CONCERNS:  MORBID OBESITY:  Last appointment we discussed her endeavors on her own to lose weight.  At that time, a one month script of Phentermine was given.  She presents today to discuss possible continuation of phentermine with periods of abstinence or progression to Saxenda, which was discussed at the last encounter.  She has tried multiple diets including weight watchers, esther antolin, Tanmay, Hernandez',  phentermine as well as exercise and calorie-reduced diet.    Hui Jimenez  has a past medical history of Hypertension and Obesity.    Allergies   Allergen Reactions   • Penicillins Other (See Comments)     Patient states childhood allergy       Current Outpatient Medications:   •  citalopram (CeleXA) 20 MG tablet, Take 1 tablet by mouth Daily., Disp: 30 tablet, Rfl: 5  •  lisinopril (PRINIVIL,ZESTRIL) 10 MG tablet, Take 10 mg by mouth Daily., Disp: , Rfl:   •  phentermine (Adipex-P) 37.5 MG tablet, Take 1 tablet by mouth Every Morning Before Breakfast., Disp: 30 tablet, Rfl: 0  •  Liraglutide -Weight Management (Saxenda) 18 MG/3ML solution pen-injector, Inject 0.6 mg SQ daily x 1 week; then 1.2 mg SQ daily x 1 week then 1.8 mg SQ daily thereafter., Disp: 2 pen, Rfl: 5  Past Medical History:   Diagnosis Date   • Hypertension    • Obesity      Past Surgical History:   Procedure Laterality Date   • TONSILLECTOMY       Social History     Socioeconomic History   • Marital status: Single     Spouse name: Not on file   • Number of children: Not on file   • Years of education: Not on file   • Highest education level: Not on file   Tobacco Use   • Smoking status: Never Smoker   • Smokeless tobacco: Never Used   Substance and Sexual Activity   • Alcohol use: No   • Drug use: No   • Sexual activity: Not Currently     Partners: Male      "Birth control/protection: Injection     Family History   Problem Relation Age of Onset   • Diabetes Mother    • Hypertension Mother        Family history, surgical history, past medical history, Allergies and med's reviewed with patient today and updated in [facility] EMR.     ROS:  Review of Systems   Constitutional: Negative.  Negative for fatigue, fever and unexpected weight change.   HENT: Negative.  Negative for facial swelling, sore throat and trouble swallowing.    Eyes: Negative.  Negative for photophobia, discharge and visual disturbance.   Respiratory: Negative.  Negative for cough, chest tightness and shortness of breath.    Cardiovascular: Negative.  Negative for chest pain and palpitations.   Gastrointestinal: Negative.  Negative for abdominal pain, diarrhea, nausea and vomiting.   Endocrine: Negative.  Negative for polydipsia, polyphagia and polyuria.   Genitourinary: Negative.  Negative for dysuria, flank pain and frequency.   Musculoskeletal: Negative.  Negative for back pain, gait problem and neck pain.   Skin: Negative.  Negative for rash.   Allergic/Immunologic: Negative.    Neurological: Negative.  Negative for dizziness, light-headedness and headaches.   Hematological: Negative.    Psychiatric/Behavioral: Negative.  Negative for self-injury and suicidal ideas.       OBJECTIVE:  Vitals:    06/09/20 0740   BP: 117/84   BP Location: Left arm   Patient Position: Sitting   Cuff Size: Large Adult   Pulse: 93   Temp: 97.7 °F (36.5 °C)   SpO2: 96%   Weight: 128 kg (281 lb 9.6 oz)   Height: 157.5 cm (62.01\")     Physical Exam   Constitutional: She is oriented to person, place, and time. She appears well-developed and well-nourished. No distress.   HENT:   Head: Normocephalic and atraumatic.   Right Ear: External ear normal.   Left Ear: External ear normal.   Nose: Nose normal.   Mouth/Throat: Oropharynx is clear and moist.   Eyes: Pupils are equal, round, and reactive to light. Conjunctivae and EOM are normal. "   Neck: Normal range of motion. Neck supple.   Cardiovascular: Normal rate, regular rhythm, normal heart sounds and intact distal pulses.   No murmur heard.  Pulmonary/Chest: Effort normal and breath sounds normal. No respiratory distress. Right breast exhibits no inverted nipple, no mass, no nipple discharge, no skin change and no tenderness. Left breast exhibits no inverted nipple, no mass, no nipple discharge, no skin change and no tenderness.   Abdominal: Soft. Bowel sounds are normal. She exhibits no distension. There is no tenderness.   Genitourinary: Vagina normal and uterus normal. No vaginal discharge found.   Genitourinary Comments: Pap obtained.   Musculoskeletal: Normal range of motion. She exhibits no edema.   Neurological: She is alert and oriented to person, place, and time.   Skin: Skin is warm and dry. Capillary refill takes less than 2 seconds. She is not diaphoretic. No erythema.   Psychiatric: She has a normal mood and affect. Her behavior is normal. Judgment and thought content normal.   Nursing note and vitals reviewed.      ASSESSMENT/ PLAN:    uHi was seen today for annual exam.    Diagnoses and all orders for this visit:    Wellness examination    Encounter for well woman exam with routine gynecological exam  -     Pap IG, Rfx HPV All Pth    Morbid obesity with BMI of 50.0-59.9, adult (CMS/[facility])  -     Liraglutide -Weight Management (Saxenda) 18 MG/3ML solution pen-injector; Inject 0.6 mg SQ daily x 1 week; then 1.2 mg SQ daily x 1 week then 1.8 mg SQ daily thereafter.    Patient counseled to continue her exercise program and calorie reduced diet.  Patient counseled to continue safe distancing and use of mask when in public and in crowded areas.  Patient counseled to contact provider with new medication concerns, once started.    Orders Placed Today:     New Medications Ordered This Visit   Medications   • Liraglutide -Weight Management (Saxenda) 18 MG/3ML solution pen-injector     Sig:  Inject 0.6 mg SQ daily x 1 week; then 1.2 mg SQ daily x 1 week then 1.8 mg SQ daily thereafter.     Dispense:  2 pen     Refill:  5        Management Plan:     An After Visit Summary was printed and given to the patient at discharge.    Follow-up: Return in about 3 months (around 9/9/2020) for Next scheduled follow up.    Ayah Prasad, CHERYL 6/9/2020 08:12  This note was electronically signed.

## 2020-06-10 LAB
CONV .: NORMAL
CYTOLOGIST CVX/VAG CYTO: NORMAL
CYTOLOGY CVX/VAG DOC CYTO: NORMAL
CYTOLOGY CVX/VAG DOC THIN PREP: NORMAL
DX ICD CODE: NORMAL
HIV 1 & 2 AB SER-IMP: NORMAL
OTHER STN SPEC: NORMAL
STAT OF ADQ CVX/VAG CYTO-IMP: NORMAL

## 2020-06-11 ENCOUNTER — TELEPHONE (OUTPATIENT)
Dept: FAMILY MEDICINE CLINIC | Facility: CLINIC | Age: 30
End: 2020-06-11

## 2020-06-11 NOTE — TELEPHONE ENCOUNTER
Ok.  Yes, the corrected weight is noted.  Will send to [facility] and we will attempt PA tomorrow.

## 2020-06-11 NOTE — TELEPHONE ENCOUNTER
Called and left patient a message on  advising continuing with Phentermine is fine and to remember this medication is 30 days on and 30 off .

## 2020-06-11 NOTE — TELEPHONE ENCOUNTER
Patient called and stated that whoever interpreted her vm was incorrect and she called advising that she wanted Mrs. Poon to know that she lost 12 pounds and not 4 pounds and the note stated and that she does want to try and get the Saxenda approved.  Please advise?

## 2020-06-11 NOTE — TELEPHONE ENCOUNTER
Patient called and stated she has lost more then 4 lbs on phentermine (per discussion with Ayah at last OV). She believes the pill is working good for her and would like to continue instead of switching to Saxenda? Please advise

## 2020-06-12 NOTE — TELEPHONE ENCOUNTER
PA for Saxenda has been completed and approved through patient's insurance.     Called and left Vm on patient's phone advising her of this.

## 2020-07-30 ENCOUNTER — TELEPHONE (OUTPATIENT)
Dept: FAMILY MEDICINE CLINIC | Facility: CLINIC | Age: 30
End: 2020-07-30

## 2020-09-17 ENCOUNTER — OFFICE VISIT (OUTPATIENT)
Dept: FAMILY MEDICINE CLINIC | Facility: CLINIC | Age: 30
End: 2020-09-17

## 2020-09-17 VITALS
SYSTOLIC BLOOD PRESSURE: 136 MMHG | DIASTOLIC BLOOD PRESSURE: 95 MMHG | BODY MASS INDEX: 48.42 KG/M2 | HEIGHT: 64 IN | TEMPERATURE: 98.2 F | HEART RATE: 108 BPM | OXYGEN SATURATION: 97 % | WEIGHT: 283.6 LBS

## 2020-09-17 DIAGNOSIS — G44.019 EPISODIC CLUSTER HEADACHE, NOT INTRACTABLE: ICD-10-CM

## 2020-09-17 DIAGNOSIS — E66.01 MORBID OBESITY WITH BMI OF 45.0-49.9, ADULT (HCC): Primary | ICD-10-CM

## 2020-09-17 DIAGNOSIS — Z79.899 LONG-TERM USE OF HIGH-RISK MEDICATION: ICD-10-CM

## 2020-09-17 PROCEDURE — 99213 OFFICE O/P EST LOW 20 MIN: CPT | Performed by: NURSE PRACTITIONER

## 2020-09-17 RX ORDER — TOPIRAMATE 50 MG/1
50 TABLET, FILM COATED ORAL DAILY
Qty: 30 TABLET | Refills: 5 | Status: SHIPPED | OUTPATIENT
Start: 2020-09-17 | End: 2021-06-08 | Stop reason: SDUPTHER

## 2020-09-17 RX ORDER — PHENTERMINE HYDROCHLORIDE 15 MG/1
15 CAPSULE ORAL EVERY MORNING
Qty: 30 CAPSULE | Refills: 2 | Status: SHIPPED | OUTPATIENT
Start: 2020-09-17 | End: 2020-12-15 | Stop reason: SDUPTHER

## 2020-09-17 NOTE — PROGRESS NOTES
Chief Complaint   Patient presents with   • Obesity        Subjective   Hui Jimenez is a 30 y.o.  female who presents today for obesity.    HPI:  OBESITY:  Patient is not tolerating the Saxenda increased dosage.  Every time she goes up to 1.8, she has extreme nausea and can't work out for getting sick.  She has been working out daily and has dropped multiple pant sizes.  She is requesting to go back on the phentermine since she is just not having good success with the Saxenda.    Hui Jimenez  has a past medical history of Hypertension and Obesity.    Allergies   Allergen Reactions   • Penicillins Other (See Comments)     Patient states childhood allergy       Current Outpatient Medications:   •  citalopram (CeleXA) 20 MG tablet, Take 1 tablet by mouth Daily., Disp: 30 tablet, Rfl: 5  •  lisinopril (PRINIVIL,ZESTRIL) 10 MG tablet, Take 10 mg by mouth Daily., Disp: , Rfl:   •  phentermine 15 MG capsule, Take 1 capsule by mouth Every Morning., Disp: 30 capsule, Rfl: 2  •  topiramate (TOPAMAX) 50 MG tablet, Take 1 tablet by mouth Daily., Disp: 30 tablet, Rfl: 5  Past Medical History:   Diagnosis Date   • Hypertension    • Obesity      Past Surgical History:   Procedure Laterality Date   • TONSILLECTOMY       Social History     Socioeconomic History   • Marital status: Single     Spouse name: Not on file   • Number of children: Not on file   • Years of education: Not on file   • Highest education level: Not on file   Tobacco Use   • Smoking status: Never Smoker   • Smokeless tobacco: Never Used   Substance and Sexual Activity   • Alcohol use: No   • Drug use: No   • Sexual activity: Not Currently     Partners: Male     Birth control/protection: Injection     Family History   Problem Relation Age of Onset   • Diabetes Mother    • Hypertension Mother        Family history, surgical history, past medical history, Allergies and med's reviewed with patient today and updated in [facility] EMR.     ROS:  Review  "of Systems   Constitutional: Negative.  Negative for fatigue, fever and unexpected weight change.   HENT: Negative.  Negative for facial swelling, sore throat and trouble swallowing.    Eyes: Negative.  Negative for photophobia, discharge and visual disturbance.   Respiratory: Negative.  Negative for cough, chest tightness and shortness of breath.    Cardiovascular: Negative.  Negative for chest pain and palpitations.   Gastrointestinal: Negative.  Negative for abdominal pain, diarrhea, nausea and vomiting.   Endocrine: Negative.  Negative for polydipsia, polyphagia and polyuria.   Genitourinary: Negative.  Negative for dysuria, flank pain and frequency.   Musculoskeletal: Negative.  Negative for back pain, gait problem and neck pain.   Skin: Negative.  Negative for rash.   Allergic/Immunologic: Negative.    Neurological: Negative.  Negative for dizziness, light-headedness and headaches.   Hematological: Negative.    Psychiatric/Behavioral: Negative.  Negative for self-injury and suicidal ideas.       OBJECTIVE:  Vitals:    09/17/20 1520   BP: 136/95   BP Location: Right arm   Patient Position: Sitting   Cuff Size: Adult   Pulse: 108   Temp: 98.2 °F (36.8 °C)   SpO2: 97%   Weight: 129 kg (283 lb 9.6 oz)   Height: 162.6 cm (64\")     Physical Exam  Vitals signs and nursing note reviewed.   Constitutional:       General: She is not in acute distress.     Appearance: She is well-developed. She is not diaphoretic.   HENT:      Head: Normocephalic and atraumatic.   Eyes:      Conjunctiva/sclera: Conjunctivae normal.      Pupils: Pupils are equal, round, and reactive to light.   Neck:      Musculoskeletal: Normal range of motion and neck supple.   Cardiovascular:      Rate and Rhythm: Normal rate and regular rhythm.      Heart sounds: Normal heart sounds. No murmur.   Pulmonary:      Effort: Pulmonary effort is normal. No respiratory distress.      Breath sounds: Normal breath sounds.   Abdominal:      General: Bowel " sounds are normal. There is no distension.      Palpations: Abdomen is soft.      Tenderness: There is no abdominal tenderness.   Musculoskeletal: Normal range of motion.   Skin:     General: Skin is warm and dry.      Capillary Refill: Capillary refill takes less than 2 seconds.      Findings: No erythema.   Neurological:      Mental Status: She is alert and oriented to person, place, and time.   Psychiatric:         Mood and Affect: Mood normal.         Behavior: Behavior normal.         Thought Content: Thought content normal.         Judgment: Judgment normal.         ASSESSMENT/ PLAN:    Hui was seen today for obesity.    Diagnoses and all orders for this visit:    Morbid obesity with BMI of 45.0-49.9, adult (CMS/[facility])  -     phentermine 15 MG capsule; Take 1 capsule by mouth Every Morning.    Episodic cluster headache, not intractable  -     topiramate (TOPAMAX) 50 MG tablet; Take 1 tablet by mouth Daily.    Long-term use of high-risk medication  -     Compliance Drug Analysis, Ur - Urine, Clean Catch    UDS and Controlled Substance Agreement performed at this visit.  LASHANDA reviewed.    Orders Placed Today:     New Medications Ordered This Visit   Medications   • topiramate (TOPAMAX) 50 MG tablet     Sig: Take 1 tablet by mouth Daily.     Dispense:  30 tablet     Refill:  5   • phentermine 15 MG capsule     Sig: Take 1 capsule by mouth Every Morning.     Dispense:  30 capsule     Refill:  2        Management Plan:     An After Visit Summary was printed and given to the patient at discharge.    Follow-up: Return in about 3 months (around 12/17/2020) for Next scheduled follow up.    Ayah Prasad, CHERYL 9/17/2020 15:59 CDT  This note was electronically signed.

## 2020-09-22 LAB — DRUGS UR: NORMAL

## 2020-12-15 ENCOUNTER — OFFICE VISIT (OUTPATIENT)
Dept: FAMILY MEDICINE CLINIC | Facility: CLINIC | Age: 30
End: 2020-12-15

## 2020-12-15 VITALS
HEART RATE: 96 BPM | TEMPERATURE: 97.9 F | DIASTOLIC BLOOD PRESSURE: 85 MMHG | SYSTOLIC BLOOD PRESSURE: 118 MMHG | WEIGHT: 273.2 LBS | HEIGHT: 64 IN | BODY MASS INDEX: 46.64 KG/M2

## 2020-12-15 DIAGNOSIS — R63.4 WEIGHT LOSS: Primary | ICD-10-CM

## 2020-12-15 DIAGNOSIS — E66.01 MORBID OBESITY WITH BMI OF 45.0-49.9, ADULT (HCC): ICD-10-CM

## 2020-12-15 PROCEDURE — 99213 OFFICE O/P EST LOW 20 MIN: CPT | Performed by: NURSE PRACTITIONER

## 2020-12-15 RX ORDER — PHENTERMINE HYDROCHLORIDE 15 MG/1
15 CAPSULE ORAL EVERY MORNING
Qty: 30 CAPSULE | Refills: 2 | Status: SHIPPED | OUTPATIENT
Start: 2020-12-15 | End: 2021-06-08 | Stop reason: SDUPTHER

## 2020-12-15 NOTE — PROGRESS NOTES
Chief Complaint   Patient presents with   • Obesity     med refill-enco        Subjective   Hui Jimenez is a 30 y.o.  female who presents today for med refill.    HPI:  Patient presents for 3 month med refill for phentermine.  She has consistently shown weight loss.  She takes her medication typically for 2weeks on and 1 week off and that has worked well for her.  She has no complaints of side effects.  She is aware of risks associated with continued use of amphetamine assisted weight loss and as previously noted, prior weight loss plans have not produced sustained weight loss for this patient.  She has not shown signs of misuse or abuse of the medication.  UDS and controlled substance agreement are up to date.  She takes a lower dose of the medication.      Hui Jimenez  has a past medical history of Hypertension and Obesity.    Allergies   Allergen Reactions   • Penicillins Other (See Comments)     Patient states childhood allergy       Current Outpatient Medications:   •  citalopram (CeleXA) 20 MG tablet, Take 1 tablet by mouth Daily., Disp: 30 tablet, Rfl: 5  •  lisinopril (PRINIVIL,ZESTRIL) 10 MG tablet, Take 10 mg by mouth Daily., Disp: , Rfl:   •  phentermine 15 MG capsule, Take 1 capsule by mouth Every Morning., Disp: 30 capsule, Rfl: 2  •  topiramate (TOPAMAX) 50 MG tablet, Take 1 tablet by mouth Daily., Disp: 30 tablet, Rfl: 5  Past Medical History:   Diagnosis Date   • Hypertension    • Obesity      Past Surgical History:   Procedure Laterality Date   • TONSILLECTOMY       Social History     Socioeconomic History   • Marital status: Single     Spouse name: Not on file   • Number of children: Not on file   • Years of education: Not on file   • Highest education level: Not on file   Tobacco Use   • Smoking status: Never Smoker   • Smokeless tobacco: Never Used   Substance and Sexual Activity   • Alcohol use: No   • Drug use: No   • Sexual activity: Not Currently     Partners: Male      "Birth control/protection: Injection     Family History   Problem Relation Age of Onset   • Diabetes Mother    • Hypertension Mother        Family history, surgical history, past medical history, Allergies and med's reviewed with patient today and updated in [facility] EMR.     ROS:  Review of Systems   Constitutional: Negative.  Negative for fatigue, fever and unexpected weight change.   HENT: Negative.  Negative for facial swelling, sore throat and trouble swallowing.    Eyes: Negative.  Negative for photophobia, discharge and visual disturbance.   Respiratory: Negative.  Negative for cough, chest tightness and shortness of breath.    Cardiovascular: Negative.  Negative for chest pain and palpitations.   Gastrointestinal: Negative.  Negative for abdominal pain, diarrhea, nausea and vomiting.   Endocrine: Negative.  Negative for polydipsia, polyphagia and polyuria.   Genitourinary: Negative.  Negative for dysuria, flank pain and frequency.   Musculoskeletal: Negative.  Negative for back pain, gait problem and neck pain.   Skin: Negative.  Negative for rash.   Allergic/Immunologic: Negative.    Neurological: Negative.  Negative for dizziness, light-headedness and headaches.   Hematological: Negative.    Psychiatric/Behavioral: Negative.  Negative for self-injury and suicidal ideas.       OBJECTIVE:  Vitals:    12/15/20 1043   BP: 118/85   BP Location: Left arm   Patient Position: Sitting   Cuff Size: Large Adult   Pulse: 96   Temp: 97.9 °F (36.6 °C)   Weight: 124 kg (273 lb 3.2 oz)   Height: 162.6 cm (64\")     Physical Exam  Vitals signs and nursing note reviewed.   Constitutional:       General: She is not in acute distress.     Appearance: Normal appearance. She is well-developed. She is obese. She is not diaphoretic.   HENT:      Head: Normocephalic and atraumatic.   Eyes:      Conjunctiva/sclera: Conjunctivae normal.      Pupils: Pupils are equal, round, and reactive to light.   Neck:      Musculoskeletal: Normal range " of motion and neck supple.   Cardiovascular:      Rate and Rhythm: Normal rate and regular rhythm.      Heart sounds: Normal heart sounds. No murmur.   Pulmonary:      Effort: Pulmonary effort is normal. No respiratory distress.      Breath sounds: Normal breath sounds.   Abdominal:      General: Bowel sounds are normal. There is no distension.      Palpations: Abdomen is soft.      Tenderness: There is no abdominal tenderness.   Musculoskeletal: Normal range of motion.   Skin:     General: Skin is warm and dry.      Capillary Refill: Capillary refill takes less than 2 seconds.      Findings: No erythema.   Neurological:      Mental Status: She is alert and oriented to person, place, and time.   Psychiatric:         Behavior: Behavior normal.         Thought Content: Thought content normal.         Judgment: Judgment normal.         ASSESSMENT/ PLAN:    Diagnoses and all orders for this visit:    1. Weight loss (Primary)    2. Morbid obesity with BMI of 45.0-49.9, adult (CMS/HCC)  -     phentermine 15 MG capsule; Take 1 capsule by mouth Every Morning.  Dispense: 30 capsule; Refill: 2        Orders Placed Today:     New Medications Ordered This Visit   Medications   • phentermine 15 MG capsule     Sig: Take 1 capsule by mouth Every Morning.     Dispense:  30 capsule     Refill:  2        Management Plan:     An After Visit Summary was printed and given to the patient at discharge.    Follow-up: Return in about 3 months (around 3/15/2021) for Next scheduled follow up.    CHERYL Mohamud 12/15/2020 11:00 CST  This note was electronically signed.

## 2021-06-08 ENCOUNTER — OFFICE VISIT (OUTPATIENT)
Dept: FAMILY MEDICINE CLINIC | Facility: CLINIC | Age: 31
End: 2021-06-08

## 2021-06-08 VITALS
TEMPERATURE: 97.6 F | DIASTOLIC BLOOD PRESSURE: 80 MMHG | BODY MASS INDEX: 47.25 KG/M2 | WEIGHT: 276.8 LBS | OXYGEN SATURATION: 100 % | SYSTOLIC BLOOD PRESSURE: 117 MMHG | HEART RATE: 105 BPM | HEIGHT: 64 IN

## 2021-06-08 DIAGNOSIS — Z00.00 WELLNESS EXAMINATION: ICD-10-CM

## 2021-06-08 DIAGNOSIS — Z11.59 ENCOUNTER FOR HEPATITIS C SCREENING TEST FOR LOW RISK PATIENT: ICD-10-CM

## 2021-06-08 DIAGNOSIS — G44.019 EPISODIC CLUSTER HEADACHE, NOT INTRACTABLE: ICD-10-CM

## 2021-06-08 DIAGNOSIS — I10 ESSENTIAL HYPERTENSION: Primary | ICD-10-CM

## 2021-06-08 DIAGNOSIS — R53.83 FATIGUE, UNSPECIFIED TYPE: ICD-10-CM

## 2021-06-08 DIAGNOSIS — E66.01 MORBID OBESITY WITH BMI OF 45.0-49.9, ADULT (HCC): ICD-10-CM

## 2021-06-08 PROCEDURE — 99213 OFFICE O/P EST LOW 20 MIN: CPT | Performed by: NURSE PRACTITIONER

## 2021-06-08 RX ORDER — TOPIRAMATE 50 MG/1
50 TABLET, FILM COATED ORAL DAILY
Qty: 30 TABLET | Refills: 5 | Status: SHIPPED | OUTPATIENT
Start: 2021-06-08 | End: 2022-10-13

## 2021-06-08 RX ORDER — PHENTERMINE HYDROCHLORIDE 15 MG/1
15 CAPSULE ORAL EVERY MORNING
Qty: 30 CAPSULE | Refills: 2 | Status: SHIPPED | OUTPATIENT
Start: 2021-06-08 | End: 2022-10-13

## 2021-06-08 NOTE — PROGRESS NOTES
"Chief Complaint  weight managment    Subjective          Hui Jimenez presents to [facility]  History of Present Illness  MED REFILL: Patient presents today for medication refill of phentermine. She has been taking phentermine as prescribed. She has been off for a month currently and under some stress at work which is contributing to her 3 lb weight gain.   She continues to work out at least 5 days weekly for at least 30 min at each session and partakes of low carb, low calorie diet.    Objective   Vital Signs:   /80 (BP Location: Left arm, Patient Position: Sitting, Cuff Size: Large Adult)   Pulse 105   Temp 97.6 °F (36.4 °C) (Infrared)   Ht 162.6 cm (64.02\")   Wt 126 kg (276 lb 12.8 oz)   SpO2 100%   BMI 47.49 kg/m²       Physical Exam  Constitutional:       Appearance: Normal appearance.   Cardiovascular:      Rate and Rhythm: Normal rate and regular rhythm.      Heart sounds: Normal heart sounds.   Pulmonary:      Effort: Pulmonary effort is normal.      Breath sounds: Normal breath sounds.   Musculoskeletal:         General: Normal range of motion.   Skin:     General: Skin is warm and dry.      Capillary Refill: Capillary refill takes less than 2 seconds.   Neurological:      General: No focal deficit present.      Mental Status: She is alert and oriented to person, place, and time.   Psychiatric:         Mood and Affect: Mood normal.         Behavior: Behavior normal.         Thought Content: Thought content normal.         Judgment: Judgment normal.        Result Review :                 Assessment and Plan    Diagnoses and all orders for this visit:    1. Morbid obesity with BMI of 45.0-49.9, adult (CMS/[facility])  -     phentermine 15 MG capsule; Take 1 capsule by mouth Every Morning.  Dispense: 30 capsule; Refill: 2    2. Episodic cluster headache, not intractable  -     topiramate (TOPAMAX) 50 MG tablet; Take 1 tablet by mouth Daily.  Dispense: 30 tablet; " Refill: 5        Follow Up   Return in about 4 weeks (around 7/6/2021) for Annual physical with labs prior.  Patient was given instructions and counseling regarding her condition or for health maintenance advice. Please see specific information pulled into the AVS if appropriate.

## 2021-07-20 ENCOUNTER — TELEMEDICINE (OUTPATIENT)
Dept: FAMILY MEDICINE CLINIC | Facility: CLINIC | Age: 31
End: 2021-07-20

## 2021-07-20 ENCOUNTER — TELEPHONE (OUTPATIENT)
Dept: FAMILY MEDICINE CLINIC | Facility: CLINIC | Age: 31
End: 2021-07-20

## 2021-07-20 DIAGNOSIS — F45.41 STRESS HEADACHE: Primary | ICD-10-CM

## 2021-07-20 DIAGNOSIS — R53.83 FATIGUE, UNSPECIFIED TYPE: ICD-10-CM

## 2021-07-20 PROCEDURE — 99213 OFFICE O/P EST LOW 20 MIN: CPT | Performed by: NURSE PRACTITIONER

## 2021-07-20 NOTE — PROGRESS NOTES
"Chief Complaint  Headache and Fatigue    Subjective          Hui Jimenez presents to [facility]  History of Present Illness  Patient works at the [employer] and has been working 15 hr shifts daily with no day off.  On Sunday she had a bad headache and \"the fatigue simply caught up with me\" and she called in both Sunday and Monday.  Her boss is requiring a work excuse from her PCP.  Today she is feeling much better and has no complaints today.    Objective   Vital Signs:   There were no vitals taken for this visit.      Physical Exam   No true physical exam in this video encounter.    Result Review :                 Assessment and Plan    Diagnoses and all orders for this visit:    1. Stress headache (Primary)    2. Fatigue, unspecified type      I spent 20 minutes caring for Hui on this date of service. This time includes time spent by me in the following activities:preparing for the visit, obtaining and/or reviewing a separately obtained history, performing a medically appropriate examination and/or evaluation , counseling and educating the patient/family/caregiver and documenting information in the medical record  Follow Up   Return if symptoms worsen or fail to improve.  Patient was given instructions and counseling regarding her condition or for health maintenance advice. Please see specific information pulled into the AVS if appropriate.       "

## 2021-07-20 NOTE — TELEPHONE ENCOUNTER
PT CALLED ASKING FOR [employer], WANTS TO KNOW IF SHE WOULD WRITE HER A DOCTOR'S EXCUSE FOR Friday AND Saturday    PT STATES SHE HAS DISCUSSED WORK HOURS WITH [employer]TE[employer] BEFORE    CALLBACK [phone]

## 2021-09-01 ENCOUNTER — TELEMEDICINE (OUTPATIENT)
Dept: FAMILY MEDICINE CLINIC | Facility: CLINIC | Age: 31
End: 2021-09-01

## 2021-09-01 DIAGNOSIS — J01.00 ACUTE MAXILLARY SINUSITIS, RECURRENCE NOT SPECIFIED: Primary | ICD-10-CM

## 2021-09-01 PROCEDURE — 99213 OFFICE O/P EST LOW 20 MIN: CPT | Performed by: NURSE PRACTITIONER

## 2021-09-01 RX ORDER — LORATADINE 10 MG/1
10 TABLET ORAL DAILY
Qty: 30 TABLET | Refills: 0 | Status: SHIPPED | OUTPATIENT
Start: 2021-09-01 | End: 2022-10-13

## 2021-09-01 RX ORDER — PREDNISONE 10 MG/1
TABLET ORAL
Qty: 21 TABLET | Refills: 0 | Status: SHIPPED | OUTPATIENT
Start: 2021-09-01 | End: 2022-10-13

## 2021-09-01 RX ORDER — FLUTICASONE PROPIONATE 50 MCG
2 SPRAY, SUSPENSION (ML) NASAL DAILY
Qty: 18.2 ML | Refills: 0 | Status: SHIPPED | OUTPATIENT
Start: 2021-09-01 | End: 2022-11-21

## 2021-09-01 RX ORDER — GUAIFENESIN 600 MG/1
1200 TABLET, EXTENDED RELEASE ORAL 2 TIMES DAILY
Qty: 56 TABLET | Refills: 0 | Status: SHIPPED | OUTPATIENT
Start: 2021-09-01 | End: 2022-10-13

## 2021-09-01 RX ORDER — AZITHROMYCIN 250 MG/1
TABLET, FILM COATED ORAL
Qty: 6 TABLET | Refills: 0 | Status: SHIPPED | OUTPATIENT
Start: 2021-09-01 | End: 2022-10-13

## 2021-09-01 NOTE — PROGRESS NOTES
CC: COVID symptoms    History:  Hui Jimenez is a 31 y.o. female who presents today for evaluation of the above problems.    Having headache, cough, sinus pressure.Initially had sore throat as well.  Symptoms started Sunday afternoon and got much worse Monday. COVID tested on Monday and was negative. She is tested twice a week by her employer.        HPI  ROS:  Review of Systems   Constitutional: Negative for fever.   HENT: Positive for congestion, sinus pressure and sore throat.    Gastrointestinal: Negative for constipation and diarrhea.   Neurological: Positive for headaches.       Allergies   Allergen Reactions   • Penicillins Other (See Comments)     Patient states childhood allergy     Past Medical History:   Diagnosis Date   • Hypertension    • Obesity      Past Surgical History:   Procedure Laterality Date   • TONSILLECTOMY       Family History   Problem Relation Age of Onset   • Diabetes Mother    • Hypertension Mother       reports that she has never smoked. She has never used smokeless tobacco. She reports that she does not drink alcohol and does not use drugs.      Current Outpatient Medications:   •  azithromycin (Zithromax) 250 MG tablet, Take 2 tablets the first day, then 1 tablet daily for 4 days., Disp: 6 tablet, Rfl: 0  •  fluticasone (Flonase) 50 MCG/ACT nasal spray, 2 sprays into the nostril(s) as directed by provider Daily., Disp: 18.2 mL, Rfl: 0  •  guaiFENesin (Mucinex) 600 MG 12 hr tablet, Take 2 tablets by mouth 2 (Two) Times a Day., Disp: 56 tablet, Rfl: 0  •  lisinopril (PRINIVIL,ZESTRIL) 10 MG tablet, Take 10 mg by mouth Daily., Disp: , Rfl:   •  loratadine (Claritin) 10 MG tablet, Take 1 tablet by mouth Daily., Disp: 30 tablet, Rfl: 0  •  phentermine 15 MG capsule, Take 1 capsule by mouth Every Morning., Disp: 30 capsule, Rfl: 2  •  predniSONE (DELTASONE) 10 MG (21) dose pack, Use as directed on package, Disp: 21 tablet, Rfl: 0  •  topiramate (TOPAMAX) 50 MG tablet, Take 1 tablet  by mouth Daily., Disp: 30 tablet, Rfl: 5    OBJECTIVE:  There were no vitals taken for this visit.   Physical Exam  Constitutional:       Appearance: She is well-developed.   Cardiovascular:      Rate and Rhythm: Normal rate.   Pulmonary:      Effort: Pulmonary effort is normal.   Neurological:      Mental Status: She is alert and oriented to person, place, and time.   Psychiatric:         Behavior: Behavior normal.         Assessment/Plan    Diagnoses and all orders for this visit:    1. Acute maxillary sinusitis, recurrence not specified (Primary)  -     azithromycin (Zithromax) 250 MG tablet; Take 2 tablets the first day, then 1 tablet daily for 4 days.  Dispense: 6 tablet; Refill: 0  -     predniSONE (DELTASONE) 10 MG (21) dose pack; Use as directed on package  Dispense: 21 tablet; Refill: 0  -     guaiFENesin (Mucinex) 600 MG 12 hr tablet; Take 2 tablets by mouth 2 (Two) Times a Day.  Dispense: 56 tablet; Refill: 0  -     loratadine (Claritin) 10 MG tablet; Take 1 tablet by mouth Daily.  Dispense: 30 tablet; Refill: 0  -     fluticasone (Flonase) 50 MCG/ACT nasal spray; 2 sprays into the nostril(s) as directed by provider Daily.  Dispense: 18.2 mL; Refill: 0      This visit was completed via secure Zoom connection.     An After Visit Summary was printed and given to the patient at discharge.  Return if symptoms worsen or fail to improve, for Next scheduled follow up.       CHERYL Briggs 9/1/21    Electronically signed.

## 2021-09-04 DIAGNOSIS — Z20.822 CLOSE EXPOSURE TO COVID-19 VIRUS: Primary | ICD-10-CM

## 2022-03-28 ENCOUNTER — TELEPHONE (OUTPATIENT)
Dept: BARIATRICS/WEIGHT MGMT | Facility: CLINIC | Age: 32
End: 2022-03-28

## 2022-03-29 ENCOUNTER — OFFICE VISIT (OUTPATIENT)
Dept: BARIATRICS/WEIGHT MGMT | Facility: CLINIC | Age: 32
End: 2022-03-29

## 2022-03-29 VITALS
BODY MASS INDEX: 51.91 KG/M2 | SYSTOLIC BLOOD PRESSURE: 135 MMHG | WEIGHT: 293 LBS | HEART RATE: 98 BPM | OXYGEN SATURATION: 97 % | DIASTOLIC BLOOD PRESSURE: 89 MMHG | TEMPERATURE: 98.6 F | HEIGHT: 63 IN

## 2022-03-29 DIAGNOSIS — E66.01 CLASS 3 SEVERE OBESITY DUE TO EXCESS CALORIES WITH SERIOUS COMORBIDITY AND BODY MASS INDEX (BMI) OF 50.0 TO 59.9 IN ADULT: Primary | ICD-10-CM

## 2022-03-29 DIAGNOSIS — I10 ESSENTIAL HYPERTENSION: ICD-10-CM

## 2022-03-29 PROCEDURE — 99203 OFFICE O/P NEW LOW 30 MIN: CPT | Performed by: SURGERY

## 2022-03-29 NOTE — PROGRESS NOTES
Patient Care Team:  Ayah Prasad APRN as PCP - General (Family Medicine)    Reason for Visit:  Surgical Weight loss    Subjective      Hui Jimenez is a pleasant 32 y.o. female and presents with morbid obesity with her Body mass index is 53.2 kg/m².    She is here for discussion of weight loss options.  She stated she has been with the disease of obesity for year(s).  She stated she suffers from hypertension and morbid obesity due to her weight gain.  She stated that weight loss helps alleviate these symptoms.   She stated that she has tried the Atkins diet, low carbohydrate diets, counting calories and medication such as Saxenda and phentermine to help with weight loss.  She stated that she has attempted these conservative methods for weight loss without maintaining long term success.  Today she would like to discuss surgical weight loss options such as the Laparoscopic Sleeve Gastrectomy or the Laparoscopic R - Y Gastric Bypass.      Review of Systems  General ROS: positive for  - fatigue and weight gain  Psychological ROS: positive for - anxiety and depression  Respiratory ROS: no cough, shortness of breath, or wheezing  Cardiovascular ROS: no chest pain or dyspnea on exertion  Gastrointestinal ROS: no abdominal pain, change in bowel habits, or black or bloody stools  Musculoskeletal ROS: negative    History  Past Medical History:   Diagnosis Date   • Hypertension    • Obesity      Past Surgical History:   Procedure Laterality Date   • TONSILLECTOMY       Family History   Problem Relation Age of Onset   • Obesity Mother    • Sleep apnea Mother    • Heart disease Mother    • Diabetes Mother    • Hypertension Mother    • No Known Problems Father    • Obesity Maternal Grandfather    • Sleep apnea Maternal Grandfather    • Heart disease Maternal Grandfather    • Stroke Maternal Grandfather    • No Known Problems Paternal Grandmother    • No Known Problems Paternal Grandfather      Social History      Tobacco Use   • Smoking status: Never Smoker   • Smokeless tobacco: Never Used   Substance Use Topics   • Alcohol use: No   • Drug use: No     E-cigarette/Vaping     E-cigarette/Vaping Substances     (Not in a hospital admission)    Allergies:  Penicillins      Current Outpatient Medications:   •  CLONIDINE HCL PO, Take  by mouth As Needed., Disp: , Rfl:   •  fluticasone (Flonase) 50 MCG/ACT nasal spray, 2 sprays into the nostril(s) as directed by provider Daily., Disp: 18.2 mL, Rfl: 0  •  lisinopril (PRINIVIL,ZESTRIL) 10 MG tablet, Take 10 mg by mouth Daily., Disp: , Rfl:   •  azithromycin (Zithromax) 250 MG tablet, Take 2 tablets the first day, then 1 tablet daily for 4 days., Disp: 6 tablet, Rfl: 0  •  guaiFENesin (Mucinex) 600 MG 12 hr tablet, Take 2 tablets by mouth 2 (Two) Times a Day., Disp: 56 tablet, Rfl: 0  •  loratadine (Claritin) 10 MG tablet, Take 1 tablet by mouth Daily., Disp: 30 tablet, Rfl: 0  •  phentermine 15 MG capsule, Take 1 capsule by mouth Every Morning., Disp: 30 capsule, Rfl: 2  •  predniSONE (DELTASONE) 10 MG (21) dose pack, Use as directed on package, Disp: 21 tablet, Rfl: 0  •  topiramate (TOPAMAX) 50 MG tablet, Take 1 tablet by mouth Daily., Disp: 30 tablet, Rfl: 5    Objective     Vital Signs  Temp:  [98.6 °F (37 °C)] 98.6 °F (37 °C)  Heart Rate:  [98] 98  BP: (135)/(89) 135/89  Body mass index is 53.2 kg/m².      03/29/22  1406   Weight: 134 kg (295 lb 9.6 oz)       General Appearance:  awake, alert, oriented, in no acute distress  Lungs:  Normal expansion.  Clear to auscultation.  No rales, rhonchi, or wheezing.  Heart:  Heart regular rate and rhythm  Abdomen:  Soft, non-tender, normal bowel sounds; no bruits, organomegaly or masses.  Abnormal shape: obese      Results Review:   None        Assessment/Plan   Encounter Diagnoses   Name Primary?   • Class 3 severe obesity due to excess calories with serious comorbidity and body mass index (BMI) of 50.0 to 59.9 in adult (HCC) Yes  "  • Essential hypertension        She has been provided a structured dietary regimen based off of her behavior.  I discussed with the patient the etiology of the disease of obesity and the potential comorbid conditions associated with this disease.  She was instructed to follow the dietary regimen and follow-up with our program in 1 month's time with any additional questions as they may arise during this time.  We emphasized on focusing on proteins and meals high in fiber as well as adequate hydration that exceed 64 ounces of water daily.  I recommended the patient record daily a food journal that would incorporate what she is eating and how many times she is eating during the day.  My recommendation included at least 21 consecutive days of recording of these meals.  I explained that I anticipate the patient to lose weight prior to her next monthly visit.  I have also explained that they need to record or document when they are going to have the \"cheat day\" as well as create a food journal to help monitor their behavior and food choices.  The patient was also made aware that if they inappropriately follow the dietary prescription there is a risk of weight gain.      I discussed the patient's findings and my recommendations with patient. The patient was made aware that we are primarily a surgical program.  We reviewed different weight loss surgical procedures including the laparoscopic sleeve gastrectomy, gastric band and Terra-en-Y gastric bypass.  I explained to the patient that medical treatment alone is ineffective for long-term results and for the reversal of morbid obesity. She and I discussed the etiology of the disease of morbid obesity.  I emphasized that weight loss through conservative methods alone statistically will not reverse this disease of obesity long-term.    Our program is not a \"weight loss program\" but focuses on the overall issue of morbid obesity and the patient has been educated on what steps " will be necessary to be successful in reversing this disease of morbid obesity at our facility.  The patient will require further evaluation of her foregut either through an upper endoscopy/EGD or radiographic studies.  I have explained the 3 pearls of our program for the patient to follow to optimize success:1.  Putting their health first, 2.  Not trying to treat the disease on their own, 3.  Attempting to make their scheduled appointments.  The patient was in agreement to following these recommendations.  The patient was also notified that our dietitian will be contacting them soon for follow-up on how they are doing with the new prescription.    I have also recommended that she obtain completion of a medically supervised weight loss program, cardiac and psychological risk assessments prior to surgery consideration.      Dr. Giancarlo Obrien MD FACS    03/29/22  14:50 CDT  Patient Care Team:  Ayah Prasad APRN as PCP - General (Family Medicine)

## 2022-05-10 ENCOUNTER — OFFICE VISIT (OUTPATIENT)
Dept: BARIATRICS/WEIGHT MGMT | Facility: CLINIC | Age: 32
End: 2022-05-10

## 2022-05-10 ENCOUNTER — HOSPITAL ENCOUNTER (OUTPATIENT)
Dept: CARDIOLOGY | Facility: HOSPITAL | Age: 32
Discharge: HOME | End: 2022-05-10
Admitting: NURSE PRACTITIONER

## 2022-05-10 VITALS
SYSTOLIC BLOOD PRESSURE: 139 MMHG | HEIGHT: 63 IN | HEART RATE: 78 BPM | DIASTOLIC BLOOD PRESSURE: 93 MMHG | TEMPERATURE: 98.7 F | BODY MASS INDEX: 49.86 KG/M2 | WEIGHT: 281.4 LBS | OXYGEN SATURATION: 96 %

## 2022-05-10 DIAGNOSIS — E66.01 CLASS 3 SEVERE OBESITY DUE TO EXCESS CALORIES WITH SERIOUS COMORBIDITY AND BODY MASS INDEX (BMI) OF 50.0 TO 59.9 IN ADULT: ICD-10-CM

## 2022-05-10 DIAGNOSIS — E66.01 CLASS 3 SEVERE OBESITY DUE TO EXCESS CALORIES WITH SERIOUS COMORBIDITY AND BODY MASS INDEX (BMI) OF 50.0 TO 59.9 IN ADULT: Primary | ICD-10-CM

## 2022-05-10 DIAGNOSIS — I10 ESSENTIAL HYPERTENSION: ICD-10-CM

## 2022-05-10 DIAGNOSIS — G47.10 DAYTIME HYPERSOMNIA: ICD-10-CM

## 2022-05-10 PROCEDURE — 93010 ELECTROCARDIOGRAM REPORT: CPT | Performed by: INTERNAL MEDICINE

## 2022-05-10 PROCEDURE — 93005 ELECTROCARDIOGRAM TRACING: CPT | Performed by: NURSE PRACTITIONER

## 2022-05-10 PROCEDURE — 99213 OFFICE O/P EST LOW 20 MIN: CPT | Performed by: NURSE PRACTITIONER

## 2022-05-10 NOTE — PROGRESS NOTES
"Patient Care Team:  Ayah Prasad APRN as PCP - General (Family Medicine)    Reason for Visit:  Surgical Weight loss    Subjective   Hui Jimenez is a 32 y.o. female.     Hui is here for follow-up and continued medical management of her morbid obesity.  She is currently on a prescription diet.  Hui previously was to apply dietary changes such as following the meal plan as directed.  She admits to eating 3 meals per day.  As a result she lost weight since her last visit.    She has lost 14 lbs since her last appointment.     Review Of Systems:  Review of Systems   Constitutional: Negative.    Respiratory: Negative.    Cardiovascular: Negative.    Gastrointestinal: Negative.    Endocrine: Negative.    Musculoskeletal: Negative.    Psychiatric/Behavioral: Negative.          The following portions of the patient's history were reviewed and updated as appropriate: allergies, current medications, past family history, past medical history, past social history, past surgical history, and problem list.    Objective   /93 (BP Location: Right arm, Patient Position: Sitting, Cuff Size: Adult)   Pulse 78   Temp 98.7 °F (37.1 °C)   Ht 158.8 cm (62.5\")   Wt 128 kg (281 lb 6.4 oz)   SpO2 96%   BMI 50.65 kg/m²       05/10/22  1042   Weight: 128 kg (281 lb 6.4 oz)       Physical Exam  Vitals reviewed.   Constitutional:       Appearance: She is obese.   Cardiovascular:      Rate and Rhythm: Normal rate and regular rhythm.   Pulmonary:      Effort: Pulmonary effort is normal.   Abdominal:      General: Bowel sounds are normal.      Palpations: Abdomen is soft.   Musculoskeletal:         General: Normal range of motion.   Skin:     General: Skin is warm and dry.   Neurological:      Mental Status: She is alert and oriented to person, place, and time.   Psychiatric:         Mood and Affect: Mood normal.         Behavior: Behavior normal.         Class 3 Severe Obesity (BMI >=40). Obesity-related health " conditions include the following: hypertension. Obesity is improving with treatment. BMI is is above average; BMI management plan is completed. We discussed portion control and increasing exercise.       Diagnoses and all orders for this visit:    1. Class 3 severe obesity due to excess calories with serious comorbidity and body mass index (BMI) of 50.0 to 59.9 in adult (HCC) (Primary)  Assessment & Plan:  Patient's (Body mass index is 50.65 kg/m².) indicates that they are morbidly obese (BMI > 40 or > 35 with obesity - related health condition) with health conditions that include hypertension . Weight is improving. BMI is is above average; BMI management plan is completed. We discussed portion control and increasing exercise.     Orders:  -     Ambulatory Referral to Psychiatry  -     ECG 12 Lead; Future  -     Home Sleep Study; Future    2. Essential hypertension  Assessment & Plan:  Hypertension is unchanged.  Continue current treatment regimen.  Blood pressure will be reassessed at the next regular appointment.    Orders:  -     ECG 12 Lead; Future    3. Daytime hypersomnia  Comments:  Sleep study ordered for further evaluation for obstructive sleep apnea.  Orders:  -     Home Sleep Study; Future       Hui Jimenez was seen today for follow-up, obesity, nutrition counseling and weight loss.  She has lost weight since her last visit.  Today we discussed healthy changes in lifestyle, diet, and exercise. Dietician consultation obtained.  Hui Jimenez had received handouts to her explaining the recommendation on portion sizes/appetite control/reading nutrition labels.   Intensive behavioral therapy for obesity was done today as well.     Goals for this month are:   1. Patient admits fatigue and waking up multiple times throughout the night. She is unsure if she snores.  I have ordered an at home sleep study for further evaluation for obstructive sleep apnea.  Patient also has obesity which could increase  risk for obstructive sleep apnea.  2.  Patient will meet with dietitian today.  Overall patient has made a lot of improvements and is doing very well.  3.  Psychiatric referral placed today.  4.  EKG orders placed today.      Follow up in one month for a weight recheck.

## 2022-05-10 NOTE — ASSESSMENT & PLAN NOTE
Patient's (Body mass index is 50.65 kg/m².) indicates that they are morbidly obese (BMI > 40 or > 35 with obesity - related health condition) with health conditions that include hypertension . Weight is improving. BMI is is above average; BMI management plan is completed. We discussed portion control and increasing exercise.

## 2022-05-10 NOTE — PROGRESS NOTES
"Metabolic and Bariatric Surgery Adult Nutrition Assessment    Patient Name: Hui Jimenez   YOB: 1990   MRN: [id number]     Assessment Date:  05/10/2022     Reason for Visit: Initial Nutrition Assessment     Treatment Pathway: Preoperative Bariatric Surgery, Visit 2    Assessment    Anthropometrics   Wt Readings from Last 1 Encounters:   5/10/2022 128 kg (281lb 6.4 oz)     Ht Readings from Last 1 Encounters:   5/10/2022 158.8 cm (62.5\")     BMI Readings from Last 1 Encounters:   5/10/2022 50.65         Initial Weight/Date: 295.6 lbs (3/29/22)  Weight Changes since last visit: -14.2 lbs   Net Weight Change: -14.2 lbs    Past Medical History:   Diagnosis Date   • Hypertension    • Obesity       Past Surgical History:   Procedure Laterality Date   • TONSILLECTOMY        Current Outpatient Medications   Medication Sig Dispense Refill   • azithromycin (Zithromax) 250 MG tablet Take 2 tablets the first day, then 1 tablet daily for 4 days. 6 tablet 0   • CLONIDINE HCL PO Take  by mouth As Needed.     • fluticasone (Flonase) 50 MCG/ACT nasal spray 2 sprays into the nostril(s) as directed by provider Daily. 18.2 mL 0   • guaiFENesin (Mucinex) 600 MG 12 hr tablet Take 2 tablets by mouth 2 (Two) Times a Day. 56 tablet 0   • lisinopril (PRINIVIL,ZESTRIL) 10 MG tablet Take 10 mg by mouth Daily.     • loratadine (Claritin) 10 MG tablet Take 1 tablet by mouth Daily. 30 tablet 0   • phentermine 15 MG capsule Take 1 capsule by mouth Every Morning. 30 capsule 2   • predniSONE (DELTASONE) 10 MG (21) dose pack Use as directed on package 21 tablet 0   • topiramate (TOPAMAX) 50 MG tablet Take 1 tablet by mouth Daily. 30 tablet 5     No current facility-administered medications for this visit.      Allergies   Allergen Reactions   • Penicillins Other (See Comments)     Patient states childhood allergy          Motivation for weight loss includes:  To be healthier and more active with 8yo daughter.    Pertinent " Social/Behavior/Environmental History: 4am-6pm, works as a  no scheduled lunch break. 20 minute drive to work. Daughter does travel softball and gymnastics so sometimes get home very late at night.    Nutrition Recall  Eating 3 meals daily   (M1) Protein shake OR eggs with cheese/cottage cheese/yogurt. No vegetables at breakfast.   (M2) n/a  (M3) 1 oz package of chicken or pork with vegetables  (M4) chicken with asapagus and broccoli   Snacking - Rarely. Chewing SF gum sometimes.   Monitoring portions- always using measuring cups  Calculating Protein- no, unsure how  Drinking carbonated beverages- no  Fluid Intake- Drinks at least 8 (16oz) bottles of water at work. Mixes 8ozcoffee with protein shake       Success this Month: Has been able to stick with it.   Barriers: Unsure how to calculate protein; difficulty getting in all four meals.    Exercise: walking/lifting weights 2d/wk      Nutrition Intervention  Nutrition education and nutrition coaching for behavior change provided.  Strategies used included Comprehensive education, Motivational Interviewing , Problem Solving, Skill Development for meal planning, Ongoing reinforcement and Provided sample menus  Review of medical weight loss prescription 4 meal/day plan and reviewed nutritional needs for Preoperative Bariatric Surgery, Visit 2.    Diet Changes  Eat 4 meals per day with protein and vegetables at each meal, no carbs after meal 2., Protein goal: 65 gms., Eat vegetables first at each meal., Discussed protein guidelines for shakes and bars., Reduce snacking -use foods from free foods list only., Monitor portion sizes using a food scale and/or measuring cup., Eliminate soda and sugar-sweetened beverages and Increase fluid intake to 64 ounces per day      Goals  1. Utilize smoothie or overnight oats with vegetables at meal 1.  2. Utilize protein shake and vegetables at meal 2.  3. Continue to track intake and start calculating  protein.    Self-monitoring strategies such as keeping a food journal (on paper or electronically) and calculating fluid/protein intake were discussed.    Monitoring/Evaluation Plan  Anticipate follow up per program protocol. Continue collaboration of care with physician and treatment team.     Electronically signed by  Rosa Rivera RDN, LD  05/10/2022 11:02 CDT.

## 2022-05-12 LAB
QT INTERVAL: 366 MS
QTC INTERVAL: 408 MS

## 2022-06-07 ENCOUNTER — OFFICE VISIT (OUTPATIENT)
Dept: BARIATRICS/WEIGHT MGMT | Facility: CLINIC | Age: 32
End: 2022-06-07

## 2022-06-07 VITALS
WEIGHT: 279.2 LBS | HEIGHT: 63 IN | BODY MASS INDEX: 49.47 KG/M2 | DIASTOLIC BLOOD PRESSURE: 87 MMHG | TEMPERATURE: 97.4 F | OXYGEN SATURATION: 97 % | SYSTOLIC BLOOD PRESSURE: 125 MMHG | HEART RATE: 90 BPM

## 2022-06-07 DIAGNOSIS — Z87.898 H/O HEARTBURN: ICD-10-CM

## 2022-06-07 DIAGNOSIS — E66.01 CLASS 3 SEVERE OBESITY DUE TO EXCESS CALORIES WITH SERIOUS COMORBIDITY AND BODY MASS INDEX (BMI) OF 50.0 TO 59.9 IN ADULT: Primary | ICD-10-CM

## 2022-06-07 DIAGNOSIS — I10 ESSENTIAL HYPERTENSION: ICD-10-CM

## 2022-06-07 PROCEDURE — 99214 OFFICE O/P EST MOD 30 MIN: CPT | Performed by: SURGERY

## 2022-06-07 NOTE — PROGRESS NOTES
Patient Care Team:  Ayah Prasad APRN as PCP - General (Family Medicine)    Reason for Visit:  Surgical Weight loss      Subjective     Hui Jimenez is a pleasant 32 y.o. female and presents with morbid obesity with her Body mass index is 50.25 kg/m².    She is here for discussion of the upper endoscopic procedure.  She stated she has been with the disease of obesity for year(s).  She stated she suffers from history of heartburn in the past as well as hypertension and morbid obesity due to her weight gain.  She stated that weight loss and avoiding foods that exacerbate heartburn helps alleviate these symptoms.   She stated that she has tried multiple diet regimens to help with weight loss.  She stated that she has attempted these conservative methods for weight loss without maintaining long term success.  Today sheand myself will discuss surgical weight loss options such as the Laparoscopic Sleeve Gastrectomy or the Laparoscopic R - Y Gastric Bypass.        Review of Systems  General ROS: negative  Respiratory ROS: no cough, shortness of breath, or wheezing  Cardiovascular ROS: no chest pain or dyspnea on exertion  Gastrointestinal ROS: no abdominal pain, change in bowel habits, or black or bloody stools  Musculoskeletal ROS: negative    History  Past Medical History:   Diagnosis Date   • Hypertension    • Obesity      Past Surgical History:   Procedure Laterality Date   • TONSILLECTOMY       Family History   Problem Relation Age of Onset   • Obesity Mother    • Sleep apnea Mother    • Heart disease Mother    • Diabetes Mother    • Hypertension Mother    • No Known Problems Father    • Obesity Maternal Grandfather    • Sleep apnea Maternal Grandfather    • Heart disease Maternal Grandfather    • Stroke Maternal Grandfather    • No Known Problems Paternal Grandmother    • No Known Problems Paternal Grandfather      Social History     Tobacco Use   • Smoking status: Never Smoker   • Smokeless tobacco:  Never Used   Substance Use Topics   • Alcohol use: No   • Drug use: No     E-cigarette/Vaping     E-cigarette/Vaping Substances     (Not in a hospital admission)    Allergies:  Penicillins      Current Outpatient Medications:   •  CLONIDINE HCL PO, Take  by mouth As Needed., Disp: , Rfl:   •  lisinopril (PRINIVIL,ZESTRIL) 10 MG tablet, Take 10 mg by mouth Daily., Disp: , Rfl:   •  azithromycin (Zithromax) 250 MG tablet, Take 2 tablets the first day, then 1 tablet daily for 4 days., Disp: 6 tablet, Rfl: 0  •  fluticasone (Flonase) 50 MCG/ACT nasal spray, 2 sprays into the nostril(s) as directed by provider Daily., Disp: 18.2 mL, Rfl: 0  •  guaiFENesin (Mucinex) 600 MG 12 hr tablet, Take 2 tablets by mouth 2 (Two) Times a Day., Disp: 56 tablet, Rfl: 0  •  loratadine (Claritin) 10 MG tablet, Take 1 tablet by mouth Daily., Disp: 30 tablet, Rfl: 0  •  phentermine 15 MG capsule, Take 1 capsule by mouth Every Morning., Disp: 30 capsule, Rfl: 2  •  predniSONE (DELTASONE) 10 MG (21) dose pack, Use as directed on package, Disp: 21 tablet, Rfl: 0  •  topiramate (TOPAMAX) 50 MG tablet, Take 1 tablet by mouth Daily., Disp: 30 tablet, Rfl: 5    Objective     Vital Signs  Temp:  [97.4 °F (36.3 °C)] 97.4 °F (36.3 °C)  Heart Rate:  [90] 90  BP: (125)/(87) 125/87  Body mass index is 50.25 kg/m².      06/07/22  1025   Weight: 127 kg (279 lb 3.2 oz)       Physical Exam:      HEENT: extra ocular movement intact and sclera clear, anicteric  Respiratory: appears well, vitals normal, no respiratory distress, acyanotic, normal RR, chest clear, no wheezing, crepitations, rhonchi, normal symmetric air entry  Cardiovascular: Regular rate and rhythm, S1, S2 normal, no murmur, click, rub or gallop  GI: Soft, non-tender, normal bowel sounds; no bruits, organomegaly or masses.  Abnormal shape: obese  Musculoskeletal: inspection - no abnormality  Neurologic: alert, oriented, normal speech, no focal findings or movement disorder noted       Results  Review:   I reviewed the patient's new clinical results.        Assessment & Plan   Encounter Diagnoses   Name Primary?   • Class 3 severe obesity due to excess calories with serious comorbidity and body mass index (BMI) of 50.0 to 59.9 in adult (HCC) Yes   • Essential hypertension    • H/O heartburn            1.  I believe this patient will be a good candidate for weight loss surgery.  I have discussed the Terra - Y Gastric Bypass, laparoscopic sleeve gastrectomy and the Laparoscopic Gastric Band procedures.  We discussed the benefits of the surgeries including the benefit of weight loss and the possible reversal of co-morbid conditions associated with morbid obesity. I explained to the patient that prior to making a definitive decision on the type of surgery she will require an esophagogastroduodenoscopy with biopsies to assess for any contraindications for surgical weight loss.  I explained the alternatives  include not doing anything, or pursuing an UGI series which only offers a diagnosis with potential less accuracy compared to EGD, the benefits of the EGD such as identifying the pathology and anatomy of the upper GI system, and the risks and complications of the endoscopy were discussed in detail as well. I discussed the risk of perforation (one out of 1375-2359, riskier with dilation), bleeding (one out of 500), and the rare risks of infection, adverse reaction to anesthesia, respiratory failure, cardiac failure including MI and adverse reaction to medications such as allergic reactions. We discussed consequences that could occur if a risk were to develop such as the need for hospitalization, blood transfusion, surgical intervention, medications, pain, disability and death. The patient verbalizes understanding and agrees to proceed. such as bleeding, perforation, swallowing difficulties and gas bloat can occur after this procedure.    Upon completion of our discussion and addressing and answering her  questions to her satisfation, informed consent was obtained.  She will be scheduled accordingly for the esophagogastroduodenoscopy procedure.    I discussed the patients findings and my recommendations with patient.     Dr. Giancarlo Obrien MD [facility]    06/07/22  11:15 CDT  Patient Care Team:  Ayah Prasad APRN as PCP - General (Family Medicine)

## 2022-09-29 ENCOUNTER — TELEMEDICINE (OUTPATIENT)
Dept: FAMILY MEDICINE CLINIC | Facility: CLINIC | Age: 32
End: 2022-09-29

## 2022-09-29 DIAGNOSIS — J40 BRONCHITIS: ICD-10-CM

## 2022-09-29 DIAGNOSIS — J01.00 ACUTE NON-RECURRENT MAXILLARY SINUSITIS: Primary | ICD-10-CM

## 2022-09-29 PROCEDURE — 99213 OFFICE O/P EST LOW 20 MIN: CPT | Performed by: NURSE PRACTITIONER

## 2022-09-29 RX ORDER — PREDNISONE 10 MG/1
TABLET ORAL
Qty: 21 TABLET | Refills: 0 | Status: SHIPPED | OUTPATIENT
Start: 2022-09-29 | End: 2022-10-13

## 2022-09-29 RX ORDER — DOXYCYCLINE 100 MG/1
100 TABLET ORAL 2 TIMES DAILY
Qty: 20 TABLET | Refills: 0 | Status: SHIPPED | OUTPATIENT
Start: 2022-09-29 | End: 2022-10-13

## 2022-09-29 RX ORDER — GUAIFENESIN 600 MG/1
1200 TABLET, EXTENDED RELEASE ORAL 2 TIMES DAILY
Qty: 28 TABLET | Refills: 0 | Status: SHIPPED | OUTPATIENT
Start: 2022-09-29 | End: 2022-11-21

## 2022-09-29 NOTE — PROGRESS NOTES
CC: cough and congestion    History:  Hui Jimenez is a 32 y.o. female who presents today for evaluation of the above problems.      Patient states that she has been sick for 2 weeks.  Complains of chest congestion and nasal congestion.  Has significant cough that causes shortness of breath and wheezing.  Does have a history of asthma.  States that she has tried multiple over the counter therapies and none of these have helped.  States that she does have a sore throat that is worse in the morning.  Has noticed significant postnasal drainage.  Has been working 7 days a week 12-hour shifts.  Works around respiratory irritants.      HPI  ROS:  Review of Systems   Constitutional: Negative for fever.   HENT: Positive for congestion, postnasal drip and sore throat.    Respiratory: Positive for cough and shortness of breath.    Neurological: Positive for headaches.       Allergies   Allergen Reactions   • Penicillins Other (See Comments)     Patient states childhood allergy     Past Medical History:   Diagnosis Date   • Hypertension    • Obesity      Past Surgical History:   Procedure Laterality Date   • TONSILLECTOMY       Family History   Problem Relation Age of Onset   • Obesity Mother    • Sleep apnea Mother    • Heart disease Mother    • Diabetes Mother    • Hypertension Mother    • No Known Problems Father    • Obesity Maternal Grandfather    • Sleep apnea Maternal Grandfather    • Heart disease Maternal Grandfather    • Stroke Maternal Grandfather    • No Known Problems Paternal Grandmother    • No Known Problems Paternal Grandfather       reports that she has never smoked. She has never used smokeless tobacco. She reports that she does not drink alcohol and does not use drugs.      Current Outpatient Medications:   •  azithromycin (Zithromax) 250 MG tablet, Take 2 tablets the first day, then 1 tablet daily for 4 days., Disp: 6 tablet, Rfl: 0  •  CLONIDINE HCL PO, Take  by mouth As Needed., Disp: , Rfl:   •   doxycycline (ADOXA) 100 MG tablet, Take 1 tablet by mouth 2 (Two) Times a Day., Disp: 20 tablet, Rfl: 0  •  fluticasone (Flonase) 50 MCG/ACT nasal spray, 2 sprays into the nostril(s) as directed by provider Daily., Disp: 18.2 mL, Rfl: 0  •  guaiFENesin (Mucinex) 600 MG 12 hr tablet, Take 2 tablets by mouth 2 (Two) Times a Day., Disp: 56 tablet, Rfl: 0  •  guaiFENesin (Mucinex) 600 MG 12 hr tablet, Take 2 tablets by mouth 2 (Two) Times a Day., Disp: 28 tablet, Rfl: 0  •  lisinopril (PRINIVIL,ZESTRIL) 10 MG tablet, Take 10 mg by mouth Daily., Disp: , Rfl:   •  loratadine (Claritin) 10 MG tablet, Take 1 tablet by mouth Daily., Disp: 30 tablet, Rfl: 0  •  phentermine 15 MG capsule, Take 1 capsule by mouth Every Morning., Disp: 30 capsule, Rfl: 2  •  predniSONE (DELTASONE) 10 MG (21) dose pack, Use as directed on package, Disp: 21 tablet, Rfl: 0  •  predniSONE (DELTASONE) 10 MG (21) dose pack, Use as directed on package, Disp: 21 tablet, Rfl: 0  •  topiramate (TOPAMAX) 50 MG tablet, Take 1 tablet by mouth Daily., Disp: 30 tablet, Rfl: 5    OBJECTIVE:  There were no vitals taken for this visit.   Physical Exam  Constitutional:       Appearance: She is well-developed.   Pulmonary:      Effort: Pulmonary effort is normal.   Neurological:      Mental Status: She is alert and oriented to person, place, and time.   Psychiatric:         Behavior: Behavior normal.         Assessment/Plan    Diagnoses and all orders for this visit:    1. Acute non-recurrent maxillary sinusitis (Primary)  -     doxycycline (ADOXA) 100 MG tablet; Take 1 tablet by mouth 2 (Two) Times a Day.  Dispense: 20 tablet; Refill: 0  -     predniSONE (DELTASONE) 10 MG (21) dose pack; Use as directed on package  Dispense: 21 tablet; Refill: 0  -     guaiFENesin (Mucinex) 600 MG 12 hr tablet; Take 2 tablets by mouth 2 (Two) Times a Day.  Dispense: 28 tablet; Refill: 0    2. Bronchitis  -     doxycycline (ADOXA) 100 MG tablet; Take 1 tablet by mouth 2 (Two) Times  a Day.  Dispense: 20 tablet; Refill: 0  -     predniSONE (DELTASONE) 10 MG (21) dose pack; Use as directed on package  Dispense: 21 tablet; Refill: 0  -     guaiFENesin (Mucinex) 600 MG 12 hr tablet; Take 2 tablets by mouth 2 (Two) Times a Day.  Dispense: 28 tablet; Refill: 0    This visit was completed via secure Zoom connection.   Patient was at home and I was in my office. Letter provided for one week off of work.    An After Visit Summary was printed and given to the patient at discharge.  Return if symptoms worsen or fail to improve, for Next scheduled follow up.       CHERYL Briggs 9/29/22    Electronically signed.

## 2022-10-11 ENCOUNTER — TELEPHONE (OUTPATIENT)
Dept: FAMILY MEDICINE CLINIC | Facility: CLINIC | Age: 32
End: 2022-10-11

## 2022-10-11 NOTE — TELEPHONE ENCOUNTER
Caller: Hui Jimenez    Relationship: Self    Best call back number: [phone]    What form or medical record are you requesting: FITNESS FOR [employer]Y FORM    Who is requesting this form or medical record from you: WORK    How would you like to receive the form or medical records (pick-up, mail, fax): FAX  If fax, what is the fax number: PATIENT STATES FAX NUMBER IS ON FORM    Timeframe paperwork needed: 10.13.22    Additional notes:     PATIENT'S PLACE OF EMPLOYMENT FAXED FITNESS FOR DUTY FORM ON 10.05.22. PATIENT IS REQUESTING THIS FORM TO BE FILLED OUT AND FAXED BACK TO HER PLACE OF EMPLOYMENT.     PATIENT WOULD LIKE TO CHECK ON THE STATUS OF THIS FORM.

## 2022-10-12 NOTE — TELEPHONE ENCOUNTER
Sorry - I didn't see it.   Unfortunately, I have to put a date on the form saying that I have assessed her on that date and that she is able to return to work.  She will have to have a visit.

## 2022-10-12 NOTE — TELEPHONE ENCOUNTER
Yes it was faxed here and I layed it on your desk on Friday.  When I spoke with patient last week she stated last visit was with you and you would need to complete.

## 2022-10-13 ENCOUNTER — OFFICE VISIT (OUTPATIENT)
Dept: FAMILY MEDICINE CLINIC | Facility: CLINIC | Age: 32
End: 2022-10-13

## 2022-10-13 VITALS
SYSTOLIC BLOOD PRESSURE: 109 MMHG | HEIGHT: 64 IN | OXYGEN SATURATION: 98 % | WEIGHT: 279.4 LBS | HEART RATE: 82 BPM | DIASTOLIC BLOOD PRESSURE: 76 MMHG | BODY MASS INDEX: 47.7 KG/M2 | TEMPERATURE: 98.4 F

## 2022-10-13 DIAGNOSIS — J45.990 EXERCISE-INDUCED ASTHMA: ICD-10-CM

## 2022-10-13 DIAGNOSIS — J40 BRONCHITIS: Primary | ICD-10-CM

## 2022-10-13 PROCEDURE — 99213 OFFICE O/P EST LOW 20 MIN: CPT | Performed by: NURSE PRACTITIONER

## 2022-10-13 RX ORDER — ALBUTEROL SULFATE 90 UG/1
2 AEROSOL, METERED RESPIRATORY (INHALATION) EVERY 4 HOURS PRN
Qty: 18 G | Refills: 2 | Status: SHIPPED | OUTPATIENT
Start: 2022-10-13

## 2022-10-13 NOTE — PROGRESS NOTES
CC: follow up bronchitis and sinusitis    History:  Hui Jimenez is a 32 y.o. female who presents today for evaluation of the above problems.      Patient is here for follow up to return to work.  Was treated for bronchitis and sinusitis on 9/29. At that point she had already been sick for two weeks and symptoms were worsening.  Reports that her symptoms have resolved after antibiotics and steroids and she feels much better. Feels that she is able to return to work without difficulty. She does not that she had childhood exercise induced asthma and can still tell at times, especially around respiratory irritants, that this is an issue.       HPI  ROS:  Review of Systems   Constitutional: Negative for fever.   HENT: Negative for congestion and sinus pressure.    Respiratory: Negative for shortness of breath.    Cardiovascular: Negative for chest pain.       Allergies   Allergen Reactions   • Penicillins Other (See Comments)     Patient states childhood allergy     Past Medical History:   Diagnosis Date   • Hypertension    • Obesity      Past Surgical History:   Procedure Laterality Date   • TONSILLECTOMY       Family History   Problem Relation Age of Onset   • Obesity Mother    • Sleep apnea Mother    • Heart disease Mother    • Diabetes Mother    • Hypertension Mother    • No Known Problems Father    • Obesity Maternal Grandfather    • Sleep apnea Maternal Grandfather    • Heart disease Maternal Grandfather    • Stroke Maternal Grandfather    • No Known Problems Paternal Grandmother    • No Known Problems Paternal Grandfather       reports that she has never smoked. She has never used smokeless tobacco. She reports that she does not drink alcohol and does not use drugs.      Current Outpatient Medications:   •  fluticasone (Flonase) 50 MCG/ACT nasal spray, 2 sprays into the nostril(s) as directed by provider Daily., Disp: 18.2 mL, Rfl: 0  •  guaiFENesin (Mucinex) 600 MG 12 hr tablet, Take 2 tablets by mouth 2  "(Two) Times a Day., Disp: 28 tablet, Rfl: 0  •  albuterol sulfate HFA (ProAir HFA) 108 (90 Base) MCG/ACT inhaler, Inhale 2 puffs Every 4 (Four) Hours As Needed for Wheezing or Shortness of Air., Disp: 18 g, Rfl: 2    OBJECTIVE:  /76 (BP Location: Left arm, Patient Position: Sitting, Cuff Size: Large Adult)   Pulse 82   Temp 98.4 °F (36.9 °C) (Temporal)   Ht 162.6 cm (64\") Comment: pt reported  Wt 127 kg (279 lb 6.4 oz)   SpO2 98%   BMI 47.96 kg/m²    Physical Exam  Vitals reviewed.   Constitutional:       Appearance: She is well-developed.   Cardiovascular:      Rate and Rhythm: Normal rate and regular rhythm.      Heart sounds: Normal heart sounds.   Pulmonary:      Effort: Pulmonary effort is normal.      Breath sounds: Normal breath sounds.   Neurological:      Mental Status: She is alert and oriented to person, place, and time.   Psychiatric:         Behavior: Behavior normal.         Assessment/Plan    Diagnoses and all orders for this visit:    1. Bronchitis (Primary)    2. Exercise-induced asthma  -     albuterol sulfate HFA (ProAir HFA) 108 (90 Base) MCG/ACT inhaler; Inhale 2 puffs Every 4 (Four) Hours As Needed for Wheezing or Shortness of Air.  Dispense: 18 g; Refill: 2    Form completed for patient that she may return to work without limitation. Rescue inhaler sent in for as needed use.     An After Visit Summary was printed and given to the patient at discharge.  Return if symptoms worsen or fail to improve, for Next scheduled follow up.       CHERYL Briggs 10/13/22    Electronically signed.  "

## 2022-11-01 ENCOUNTER — TELEPHONE (OUTPATIENT)
Dept: FAMILY MEDICINE CLINIC | Facility: CLINIC | Age: 32
End: 2022-11-01

## 2022-11-01 NOTE — TELEPHONE ENCOUNTER
I contacted Capt Zacarias and informed letter was legitmate per Gaby's response.  He then questioned letter again so, I called Gaby over to my desk to have her further look into patient's chart. After Gaby reviewed patient's chart, she noticed patient was not seen in office on that date in question and work note did not come from this office.  He verbalized understanding of patient not being seen and note was not from this office. He then proceeded to ask me if he could have a record a message between he and I for an ongoing investigation.  After I gave consent, he then recorded our conversation of he stating his name and then I gave my introduction as office personnel.  He the proceeded to ask if the letter came from this office, to which my response was No  Recording was stopped and call was ended.

## 2022-11-01 NOTE — TELEPHONE ENCOUNTER
Capt Zacarias calling about a work note he received dated 10.27.22 from this patient.  Had some concerns since it did not look like a doctor's note and no signature.  Asked if he could fax copy for verification..provided our fax number and will let you know when receive.  Informed last office excuse documented in patient's chart were dated 09.29.22 and fitness for duty form completed 10.13.22. He verbalized and would fax what he had received for review.

## 2022-11-21 ENCOUNTER — OFFICE VISIT (OUTPATIENT)
Dept: FAMILY MEDICINE CLINIC | Facility: CLINIC | Age: 32
End: 2022-11-21

## 2022-11-21 VITALS
SYSTOLIC BLOOD PRESSURE: 126 MMHG | DIASTOLIC BLOOD PRESSURE: 84 MMHG | WEIGHT: 282 LBS | OXYGEN SATURATION: 100 % | HEART RATE: 88 BPM | HEIGHT: 64 IN | BODY MASS INDEX: 48.14 KG/M2 | TEMPERATURE: 97.8 F

## 2022-11-21 DIAGNOSIS — M79.671 INFLAMMATORY PAIN OF RIGHT HEEL: ICD-10-CM

## 2022-11-21 DIAGNOSIS — M72.2 PLANTAR FASCIITIS OF RIGHT FOOT: Primary | ICD-10-CM

## 2022-11-21 PROCEDURE — 99213 OFFICE O/P EST LOW 20 MIN: CPT | Performed by: NURSE PRACTITIONER

## 2022-11-21 RX ORDER — NAPROXEN 500 MG/1
500 TABLET ORAL 2 TIMES DAILY WITH MEALS
Qty: 60 TABLET | Refills: 2 | Status: SHIPPED | OUTPATIENT
Start: 2022-11-21

## 2022-11-21 RX ORDER — METHYLPREDNISOLONE 4 MG/1
TABLET ORAL
Qty: 1 EACH | Refills: 0 | Status: SHIPPED | OUTPATIENT
Start: 2022-11-21

## 2022-11-21 NOTE — PROGRESS NOTES
"  Answers for HPI/ROS submitted by the patient on 11/21/2022  Please describe your symptoms.: Pain in right heel  Have you had these symptoms before?: No  How long have you been having these symptoms?: Greater than 2 weeks  Please list any medications you are currently taking for this condition.: N/a  Please describe any probable cause for these symptoms. : Unsure  What is the primary reason for your visit?: Other    Chief Complaint  Foot Pain (RIGHT HEEL x2-3 weeks)    Subjective        Hui Jimenez presents to [facility]  History of Present Illness  Right heel pain for the past 2-3 weeks.  It is worse when she first arises in the morning or after sitting for a long time.  The pain goes up the heel and into the arch.  She works as a  and must be on her feet for her 16 hr shift.  No treatment provided.  This is the first episode of this pain.    Objective   Vital Signs:  /84 (BP Location: Right arm, Patient Position: Sitting, Cuff Size: Adult) Comment (BP Location): wrist  Pulse 88   Temp 97.8 °F (36.6 °C)   Ht 162.6 cm (64\")   Wt 128 kg (282 lb)   SpO2 100%   BMI 48.41 kg/m²   Estimated body mass index is 48.41 kg/m² as calculated from the following:    Height as of this encounter: 162.6 cm (64\").    Weight as of this encounter: 128 kg (282 lb).      Physical Exam  Vitals and nursing note reviewed.   Constitutional:       General: She is not in acute distress.     Appearance: Normal appearance. She is obese. She is not ill-appearing.   HENT:      Head: Normocephalic and atraumatic.   Cardiovascular:      Rate and Rhythm: Normal rate and regular rhythm.      Heart sounds: Normal heart sounds.   Pulmonary:      Effort: Pulmonary effort is normal.      Breath sounds: Normal breath sounds.   Musculoskeletal:         General: Swelling and tenderness present.      Comments: Pain with palpation of vik's tendon on the right with radiation into the " heel and mid foot.  Thickened tendon to arch.  Point tenderness to heel.   Skin:     General: Skin is warm and dry.   Neurological:      Mental Status: She is alert and oriented to person, place, and time.        Result Review :                Assessment and Plan   Diagnoses and all orders for this visit:    1. Plantar fasciitis of right foot (Primary)  -     methylPREDNISolone (MEDROL) 4 MG dose pack; Take as directed on package instructions.  Dispense: 1 each; Refill: 0  -     naproxen (Naprosyn) 500 MG tablet; Take 1 tablet by mouth 2 (Two) Times a Day With Meals.  Dispense: 60 tablet; Refill: 2    2. Inflammatory pain of right heel  -     methylPREDNISolone (MEDROL) 4 MG dose pack; Take as directed on package instructions.  Dispense: 1 each; Refill: 0  -     naproxen (Naprosyn) 500 MG tablet; Take 1 tablet by mouth 2 (Two) Times a Day With Meals.  Dispense: 60 tablet; Refill: 2    Icing to arch 10 min BID.  Recommended plantar fasciitis arch support to right shoe daily.         Follow Up   Return in about 1 week (around 11/28/2022) for Recheck.  Patient was given instructions and counseling regarding her condition or for health maintenance advice. Please see specific information pulled into the AVS if appropriate.     CHERYL Mohamud  This note is electronically signed.

## 2022-11-28 NOTE — TELEPHONE ENCOUNTER
Capt Hernadez calling to clarify that we did not see patient or her daughter (Sam Arvizu) on 10/20/22 or patient on 10/24/22.  Provided NO answers to both of those dates.  No recording and call was ended.